# Patient Record
Sex: FEMALE | Race: NATIVE HAWAIIAN OR OTHER PACIFIC ISLANDER | Employment: FULL TIME | URBAN - METROPOLITAN AREA
[De-identification: names, ages, dates, MRNs, and addresses within clinical notes are randomized per-mention and may not be internally consistent; named-entity substitution may affect disease eponyms.]

---

## 2017-03-17 ENCOUNTER — OFFICE VISIT (OUTPATIENT)
Dept: SURGERY | Age: 42
End: 2017-03-17

## 2017-03-17 VITALS
RESPIRATION RATE: 16 BRPM | OXYGEN SATURATION: 100 % | HEIGHT: 64 IN | WEIGHT: 240 LBS | SYSTOLIC BLOOD PRESSURE: 134 MMHG | BODY MASS INDEX: 40.97 KG/M2 | DIASTOLIC BLOOD PRESSURE: 83 MMHG | HEART RATE: 89 BPM

## 2017-03-17 DIAGNOSIS — G47.30 SLEEP APNEA, UNSPECIFIED TYPE: ICD-10-CM

## 2017-03-17 DIAGNOSIS — F17.200 SMOKING: ICD-10-CM

## 2017-03-17 DIAGNOSIS — E28.2 PCOS (POLYCYSTIC OVARIAN SYNDROME): ICD-10-CM

## 2017-03-17 DIAGNOSIS — E66.01 MORBID OBESITY WITH BODY MASS INDEX OF 40.0-49.9 (HCC): Primary | ICD-10-CM

## 2017-03-17 DIAGNOSIS — E78.00 HYPERCHOLESTEROLEMIA: ICD-10-CM

## 2017-03-17 DIAGNOSIS — E11.9 TYPE 2 DIABETES MELLITUS WITHOUT COMPLICATION, WITHOUT LONG-TERM CURRENT USE OF INSULIN (HCC): ICD-10-CM

## 2017-03-17 DIAGNOSIS — E66.01 MORBID OBESITY DUE TO EXCESS CALORIES (HCC): ICD-10-CM

## 2017-03-17 DIAGNOSIS — N92.6 IRREGULAR MENSES: ICD-10-CM

## 2017-03-17 DIAGNOSIS — I10 ESSENTIAL HYPERTENSION: ICD-10-CM

## 2017-03-17 DIAGNOSIS — F32.A DEPRESSION, UNSPECIFIED DEPRESSION TYPE: ICD-10-CM

## 2017-03-17 RX ORDER — VARENICLINE TARTRATE 1 MG/1
1 TABLET, FILM COATED ORAL
COMMUNITY
End: 2017-07-25

## 2017-03-17 RX ORDER — VALSARTAN AND HYDROCHLOROTHIAZIDE 160; 12.5 MG/1; MG/1
1 TABLET, FILM COATED ORAL
COMMUNITY
End: 2018-02-12

## 2017-03-17 RX ORDER — BISMUTH SUBSALICYLATE 262 MG
1 TABLET,CHEWABLE ORAL DAILY
COMMUNITY
End: 2017-08-16

## 2017-03-17 RX ORDER — BUPROPION HYDROCHLORIDE 150 MG/1
TABLET, EXTENDED RELEASE ORAL 2 TIMES DAILY
COMMUNITY
End: 2017-06-29

## 2017-03-17 RX ORDER — ATORVASTATIN CALCIUM 20 MG/1
20 TABLET, FILM COATED ORAL
COMMUNITY

## 2017-03-17 RX ORDER — LABETALOL 100 MG/1
100 TABLET, FILM COATED ORAL 2 TIMES DAILY
COMMUNITY

## 2017-03-17 RX ORDER — METFORMIN HYDROCHLORIDE 1000 MG/1
1000 TABLET ORAL 2 TIMES DAILY WITH MEALS
COMMUNITY
End: 2017-08-16

## 2017-03-17 NOTE — MR AVS SNAPSHOT
Visit Information Date & Time Provider Department Dept. Phone Encounter #  
 3/17/2017  9:30 AM Rosales Anders MD [facility] [place] [phone] Follow-up Instructions Follow-up and Disposition History Upcoming Health Maintenance Date Due HEMOGLOBIN A1C Q6M 1976 LIPID PANEL Q1 1976 FOOT EXAM Q1 9/9/1986 MICROALBUMIN Q1 9/9/1986 EYE EXAM RETINAL OR DILATED Q1 9/9/1986 Pneumococcal 19-64 Medium Risk (1 of 1 - PPSV23) 9/9/1995 DTaP/Tdap/Td series (1 - Tdap) 9/9/1997 PAP AKA CERVICAL CYTOLOGY 9/9/1997 INFLUENZA AGE 9 TO ADULT 8/1/2016 Allergies as of 3/17/2017  Review Complete On: 3/17/2017 By: Rosales Anders MD  
 No Known Allergies Current Immunizations  Never Reviewed No immunizations on file. Not reviewed this visit You Were Diagnosed With   
  
 Codes Comments Morbid obesity with body mass index of 40.0-49.9 (HCC)    -  Primary ICD-10-CM: E66.01 
ICD-9-CM: 278.01 Morbid obesity due to excess calories (HCC)     ICD-10-CM: E66.01 
ICD-9-CM: 278.01 Type 2 diabetes mellitus without complication, without long-term current use of insulin (HCC)     ICD-10-CM: E11.9 ICD-9-CM: 250.00 Essential hypertension     ICD-10-CM: I10 
ICD-9-CM: 401.9 Hypercholesterolemia     ICD-10-CM: E78.00 ICD-9-CM: 272.0 Smoking     ICD-10-CM: F17.200 ICD-9-CM: 305.1 Depression, unspecified depression type     ICD-10-CM: F32.9 ICD-9-CM: 477 Sleep apnea, unspecified type     ICD-10-CM: G47.30 ICD-9-CM: 780.57 Irregular menses     ICD-10-CM: N92.6 ICD-9-CM: 626.4 PCOS (polycystic ovarian syndrome)     ICD-10-CM: E28.2 ICD-9-CM: 256.4 Vitals BP Pulse Resp Height(growth percentile) Weight(growth percentile) SpO2  
 134/83 (BP 1 Location: Left arm, BP Patient Position: Sitting) 89 16 5' 4\" (1.626 m) 240 lb (108.9 kg) 100% BMI OB Status Smoking Status 41.2 kg/m2 Having regular periods Current Every Day Smoker Vitals History BMI and BSA Data Body Mass Index Body Surface Area  
 41.2 kg/m 2 2.22 m 2 Your Updated Medication List  
  
   
This list is accurate as of: 3/17/17 10:41 AM.  Always use your most recent med list.  
  
  
  
  
 atorvastatin 20 mg tablet Commonly known as:  LIPITOR Take  by mouth daily. CHANTIX 1 mg tablet Generic drug:  varenicline Take 1 mg by mouth two (2) times daily (after meals). FISH OIL PO Take  by mouth. labetalol 100 mg tablet Commonly known as:  Casie Runner Take  by mouth two (2) times a day. metFORMIN 1,000 mg tablet Commonly known as:  GLUCOPHAGE Take 1,000 mg by mouth two (2) times daily (with meals). multivitamin tablet Commonly known as:  ONE A DAY Take 1 Tab by mouth daily. valsartan-hydroCHLOROthiazide 160-12.5 mg per tablet Commonly known as:  DIOVAN-HCT Take 1 Tab by mouth daily. VICTOZA 3-VISHAL SC  
by SubCUTAneous route. WELLBUTRIN  mg SR tablet Generic drug:  buPROPion SR Take  by mouth two (2) times a day. To-Do List   
 03/17/2017 Lab:  CBC WITH AUTOMATED DIFF   
  
 03/17/2017 Lab:  H. PYLORI ABS, IGG, IGA, IGM   
  
 03/17/2017 Lab:  TSH 3RD GENERATION Patient Instructions Body Mass Index: Care Instructions Your Care Instructions Body mass index (BMI) can help you see if your weight is raising your risk for health problems. It uses a formula to compare how much you weigh with how tall you are. A BMI between 18.5 and 24.9 is considered healthy. A BMI between 25 and 29.9 is considered overweight. A BMI of 30 or higher is considered obese. If your BMI is in the normal range, it means that you have a lower risk for weight-related health problems.  If your BMI is in the overweight or obese range, you may be at increased risk for weight-related health problems, such as high blood pressure, heart disease, stroke, arthritis or joint pain, and diabetes. BMI is just one measure of your risk for weight-related health problems. You may be at higher risk for health problems if you are not active, you eat an unhealthy diet, or you drink too much alcohol or use tobacco products. Follow-up care is a key part of your treatment and safety. Be sure to make and go to all appointments, and call your doctor if you are having problems. It's also a good idea to know your test results and keep a list of the medicines you take. How can you care for yourself at home? · Practice healthy eating habits. This includes eating plenty of fruits, vegetables, whole grains, lean protein, and low-fat dairy. · Get at least 30 minutes of exercise 5 days a week or more. Brisk walking is a good choice. You also may want to do other activities, such as running, swimming, cycling, or playing tennis or team sports. · Do not smoke. Smoking can increase your risk for health problems. If you need help quitting, talk to your doctor about stop-smoking programs and medicines. These can increase your chances of quitting for good. · Limit alcohol to 2 drinks a day for men and 1 drink a day for women. Too much alcohol can cause health problems. If you have a BMI higher than 25 · Your doctor may do other tests to check your risk for weight-related health problems. This may include measuring the distance around your waist. A waist measurement of more than 40 inches in men or 35 inches in women can increase the risk of weight-related health problems. · Talk with your doctor about steps you can take to stay healthy or improve your health. You may need to make lifestyle changes to lose weight and stay healthy, such as changing your diet and getting regular exercise. Where can you learn more? Go to http://mariano-ashlee.info/. Enter S176 in the search box to learn more about \"Body Mass Index: Care Instructions. \" Current as of: February 16, 2016 Content Version: 11.1 © 0703-8085 [employer]. Care instructions adapted under license by [employer] (which disclaims liability or warranty for this information). If you have questions about a medical condition or this instruction, always ask your healthcare professional. [facility] any warranty or liability for your use of this information. New patient Instructions 1. Ensure all pre-operative insurances requirements are complete (ie; dietary visits, psychology consults, primary care documentation, etc) 2. Adhere to pre-operative weight loss / weight maintenance plan discussed in the office today. 3. Contact the office with any questions on pre-operative clearance issues (ie; cardiology work-up, pulmonary work-up, upper GI study, etc). 4. If a barium upper GI study has been ordered for your evaluation, make sure you are on liquids only the morning of the procedure. Patient Instructions History Introducing [facility]! Celi Rubin introduces EpicTopic patient portal. Now you can access parts of your medical record, email your doctor's office, and request medication refills online. 1. In your internet browser, go to [email or web address] 2. Click on the First Time User? Click Here link in the Sign In box. You will see the New Member Sign Up page. 3. Enter your EpicTopic Access Code exactly as it appears below. You will not need to use this code after youve completed the sign-up process. If you do not sign up before the expiration date, you must request a new code. · EpicTopic Access Code: [email or web address] Expires: 6/15/2017 10:05 AM 
 
4. Enter the last four digits of your Social Security Number (xxxx) and Date of Birth (mm/dd/yyyy) as indicated and click Submit.  You will be taken to the next sign-up page. 5. Create a National Payment Network ID. This will be your [employer] login ID and cannot be changed, so think of one that is secure and easy to remember. 6. Create a [employer] password. You can change your password at any time. 7. Enter your Password Reset Question and Answer. This can be used at a later time if you forget your password. 8. Enter your e-mail address. You will receive e-mail notification when new information is available in [address]. 9. Click Sign Up. You can now view and download portions of your medical record. 10. Click the Download Summary menu link to download a portable copy of your medical information. If you have questions, please visit the Frequently Asked Questions section of the [employer] website. Remember, [employer] is NOT to be used for urgent needs. For medical emergencies, dial 911. Now available from your iPhone and Android! Please provide this summary of care documentation to your next provider. Your primary care clinician is listed as Adolfo Hickman. If you have any questions after today's visit, please call [phone].

## 2017-03-17 NOTE — PROGRESS NOTES
Bariatric Surgery Consultation    Subjective: The patient is a 37 y.o. obese female with a Body mass index is 41.2 kg/(m^2). Elias Roque The patient is currently her heaviest weight for the past 2 years. she has been overweight since childhood. she has been considering surgery since last year. she desires surgery at this time because of   multiple health concerns and their lifestyle issues which are hindered by their weight. she has been referred by his family physician Dr Norma Valderrama   for evaluation and treatment of their obesity via surgical intervention. Jason Wagner has tried multiple diets in her lifetime most recently   tried behavior modification and unsupervised diets    Bariatric comorbidities present are   Patient Active Problem List   Diagnosis Code    Morbid obesity ([facility] E66.01    Morbid obesity with body mass index of 40.0-49.9 ([address] E66.01    Diabetes mellitus ([facility] [address] E11.9    Hypertension I10    Hypercholesterolemia E78.00    Smoking F17.200    Depression F32.9    Sleep apnea G47.30    Irregular menses N92.6    PCOS (polycystic ovarian syndrome) E28.2       The patient is considering laparoscopic gastric bypass surgery for surgical weight loss due to their ineffective progress with medical forms of   weight loss and the urging of their physician who cares for their primary medical issues. The patient  now presents  for consideration for weight   loss surgery understanding the benefits of this over a medical approach of weight loss as was discussed in our presentation on weight loss surgery. They have discussed their plans both with their family and primary care physician who is in support of their pursuit of such. The patient has had no   health issues as of late and denies and gastrointestinal disturbances other than what is outlined below in their review of symptoms.  All of their   prior evaluations available by both their PCP's and specialists physicians have been reviewed today either in the Care Everywhere portal or scanned under the media tab. I have spent a large portion of my initial consultation today reviewing the patients current dietary habits which have contributed to their health   issues and obesity. I have suggested to them personally a dietary regimen that they can initiate now to help with their status as it pertains to their weight. They   understand that the most important aspect of their journey through their weight loss endeavor will be their adherence to a new lifestyle of   healthy eating behavior. They also understand that an adherence to an exercise program will not only help with weight loss but is ultimately   important in weight maintenance. The patients goal weight is 157 lb. These goals are consistent with expected outcomes of their desired operation. her Medical goals are resolution of these health issues. Patient Active Problem List    Diagnosis Date Noted    Morbid obesity ([facility] [address]     Morbid obesity with body mass index of 40.0-49.9 ([facility] [address]     [facility]     Hypertension     Hypercholesterolemia     Smoking     Depression     Sleep apnea     Irregular menses     PCOS (polycystic ovarian syndrome)       Past Surgical History:   Procedure Laterality Date    DELIVERY       X 2    HX KNEE ARTHROSCOPY      HX TONSILLECTOMY      HX TUBAL LIGATION        Social History   Substance Use Topics    Smoking status: Current Every Day Smoker     Years: 0.10     Types: Cigarettes    Smokeless tobacco: Not on file    Alcohol use No      Family History   Problem Relation Age of Onset    Hypertension Father     Arthritis-osteo Father       Current Outpatient Prescriptions   Medication Sig Dispense Refill    valsartan-hydroCHLOROthiazide (DIOVAN-HCT) 160-12.5 mg per tablet Take 1 Tab by mouth daily.  metFORMIN (GLUCOPHAGE) 1,000 mg tablet Take 1,000 mg by mouth two (2) times daily (with meals).       LIRAGLUTIDE (VICTOZA 3-VISHAL SC) by SubCUTAneous route.  atorvastatin (LIPITOR) 20 mg tablet Take  by mouth daily.  buPROPion SR (WELLBUTRIN SR) 150 mg SR tablet Take  by mouth two (2) times a day.  multivitamin (ONE A DAY) tablet Take 1 Tab by mouth daily.  DOCOSAHEXANOIC ACID/EPA (FISH OIL PO) Take  by mouth.  labetalol (NORMODYNE) 100 mg tablet Take  by mouth two (2) times a day.  varenicline (CHANTIX) 1 mg tablet Take 1 mg by mouth two (2) times daily (after meals).        No Known Allergies       Review of Systems:        General - No history or complaints of unexpected fever, chills, or weight loss  Head/Neck - No history or complaints of headache, diplopia, dysphagia, hearing loss  Cardiac - No history or complaints of chest pain, palpitations, murmur, or shortness of breath  Pulmonary - No history or complaints of shortness of breath, productive cough, hemoptysis  Gastrointestinal - (-) reflux noted,no  abdominal pain, obstipation/constipation or blood per rectum  Genitourinary - No history or complaints of hematuria/dysuria, stress urinary incontinence symptoms, or renal lithiasis  Musculoskeletal - mild joint pain in their knees,  no muscular weakness  Hematologic - No history or complaints of bleeding disorders,  No blood transfusions  Neurologic - No history or complaints of  migraine headaches, seizure activity, syncopal episodes, TIA or stroke  Integumentary - No history or complaints of rashes, abnormal nevi, skin cancer  Gynecological - irregular menses due to PCOS    Objective:     Visit Vitals    /83 (BP 1 Location: Left arm, BP Patient Position: Sitting)    Pulse 89    Resp 16    Ht 5' 4\" (1.626 m)    Wt 108.9 kg (240 lb)    SpO2 100%    BMI 41.2 kg/m2       Physical Examination: General appearance - alert, well appearing, and in no distress and oriented to person, place, and time  Mental status - alert, oriented to person, place, and time, normal mood, behavior, speech, dress, motor activity, and thought processes  Eyes - pupils equal and reactive, extraocular eye movements intact, sclera anicteric, left eye normal, right eye normal  Ears - right ear normal, left ear normal  Nose - normal and patent, no erythema, discharge or polyps  Mouth - mucous membranes moist, pharynx normal without lesions  Neck - supple, no significant adenopathy  Lymphatics - no palpable lymphadenopathy, no hepatosplenomegaly  Chest - clear to auscultation, no wheezes, rales or rhonchi, symmetric air entry  Heart - normal rate, regular rhythm, normal S1, S2, no murmurs, rubs, clicks or gallops  Abdomen - soft, nontender, nondistended, no masses or organomegaly  Back exam - full range of motion, no tenderness, palpable spasm or pain on motion  Neurological - alert, oriented, normal speech, no focal findings or movement disorder noted  Musculoskeletal - no joint tenderness, deformity or swelling  Extremities - peripheral pulses normal, no pedal edema, no clubbing or cyanosis  Skin - normal coloration and turgor, no rashes, no suspicious skin lesions noted    Labs:     No results found for: WBC, WBCLT, HGBPOC, HGB, HGBP, HCTPOC, HCT, PHCT, RBCH, PLT, MCV, HGBEXT, HCTEXT, PLTEXT, HGBEXT, HCTEXT, PLTEXT  No results found for: NA, K, CL, CO2, AGAP, GLU, BUN, CREA, BUCR, GFRAA, GFRNA, CA, TBIL, TBILI, GPT, SGOT, AP, TP, ALB, GLOB, AGRAT, ALT  No results found for: IRON, FE, TIBC, IBCT, PSAT, FERR  No results found for: FOL, RBCF  No results found for: VITD3, XQVID2, XQVID3, XQVID, VD3RIA              Assessment:     Morbid obesity with associated comorbidity    Plan:     laparoscopic gastric bypass surgery    This is a 37 y.o. female with a BMI of Body mass index is 41.2 kg/(m^2). and the weight-related co-morbidties of (as above). Nallely Montana meets the NIH criteria for bariatric surgery based upon the BMI of Body mass index is 41.2 kg/(m^2). and multiple weight-related co-morbidties.  Nallely Montana has elected laparoscopic gastric bypass as her intervention of choice for treatment of morbid obestiy through surgical means secondary to its uniform results,  profound baseline suppression of hunger and pace at which weight is lost.    In the office today, following Edie's history and physical examination, a 30 minute discussion regarding the anatomic alterations for the laparoscopic gastric bypass  was undertaken. The dietary expectations and the patient  dependent factors for success were thoroughly discussed, to include the need for interval follow-up and long-term dietary changes associated with success. The possible short and long term  complications of the gastric bypass were also discussed, to include but not limited to;death, DVT/PE, staple line leak, bleeding, stricture formation, infection,internal hernia  and pouch dilation. Specific weight related outcomes for success were also discussed with an emphasis on careful and close follow-up with the first year and dietary behavior modification over the first years as baseline cyclical hunger returns  The patient expressed an understanding of the above factors, and her questions were answered in their entirety. In addition, the patient attended a 1.5 hour power point seminar regarding obesity, surgical weight loss including, adjustable gastric band, gastric bypass, and sleeve gastrectomy. This discussion contrasted the different surgical techniques, mechanisms of actions and expected outcomes, and surgical and medical risks associated with each procedure. During this seminar, there was a long question and answer session where each questions was answered until there were no additional questions. Today, the patient had all of her questions answered and desires to proceed with  bariatric surgery initially choosing the gastric bypass as her surgical option.     She will complete her [facility] program in June 2017    Secondary Diagnoses:     Dietary Intervention  - The patient is currently scheduled to see or has been followed by a bariatric nutritionist for an attempt at preoperative weight loss as has been dictated by their insurance carrier. They will be assessed at various times during their follow up to evaluate their progress depending on the length of time that is required once again by their carrier. I have explained the importance of preoperative weight loss and the benefits regarding lower surgical risk and also assisting the patient in reaching their weight loss goal.  Finally they understand their is a physiologic benefit from the standpoint of hepatic volume reduction preoperatively. I have reiterated the importance of a low carbohydrate and high protein regimen to achieve their stated goal.    Adult Onset Diabetes - The patient has perla given a very low carbohydrate diet preoperatively along with instructions to monitor their blood sugars on a regular daily basis. When  their surgery is performed  we will be monitoring the patient with sliding scale insulin and accuchecks.  Based on those values we will determine whether the patient needs a reduction of those medications postoperatively or total removal of those medications on discharge.  We will have the patient continue accuchecks postoperatively while at home also and report to me or their family physician for appropriate adjustments as needed.  The patient also understands that in the event of uncontrolled blood sugar preoperatively that we may choose to postpone their surgery. Hypertension - The patient has a clear understanding of how weight loss improves hypertension as a whole, but also they understand that there is a significant genetic component to this disease process.  We will monitor the patients blood pressure while in the hospital and the plan would be to continue those medications postoperatively.  If a diuretic is being used we will stop them on discharge to prevent dehydration particularly with the sleeve gastrectomy and the gastric bypass procedures.  They will be instructed to monitor their blood pressure postoperatively while at home and notify their primary care physician in the event of any significantly high or uncharacteristic readings. Hyperlipidemia - The patient understands that studies show that almost all patient will realize an improvement in their lipid profile with weight loss that occurs with these procedures. They however also understand that hyperlipidemia is a multifactorial disease particularly as it pertains to their genetic background and that there is no guarantee toward cure  of this issue. We will resume their medications immediately postoperatively as this tends to decrease any post operative cardiac events.  The patient will follow up with their family physician in the postoperative period with plans to repeat their lipid panel 2-3 month postoperative for potential adjustment or removal of these medications. Obstructive Sleep Apnea -The patient understands the association of sleep apnea and obesity and the additional risk that it caries related to post surgical complications. We will have the patient bring their CPAP machine to the hospital for use both postoperatively in the PACU and on the floor at its appropriate setting.  We will have them continue using it while at home after surgery and follow up with their pulmonologist 6 months after to be retested to see if it can be discontinued at that time period.     Polycystic Ovarian Syndrome -The patient does understand the association between obesity and the development of PCOS.  They understand that weight loss can often improve symptoms and in many cases cure the disease.  If the disease is associated with infertility this too is often corrected with adequate weight loss.  The patient understands that any change to the pharmaceutical treatment of her PCOS will be managed by the primary care physician or OB/GYN    Smoking Cessation - Today I have counseled the patient extensively regarding smoking cessation for greater than 10 minutes. They have been counseled extensively about the detrimental effects of smoking on their weight loss surgical procedure particularly for the gastric bypass and sleeve gastrectomy procedures. They understand that smoking leads to pulmonary issues postoperatively and can lead to gastric ulcers and marginal ulcers in the post bariatric surgery pouch that has been created. They understand that they must stop smoking prior to surgery or it may affect their ultimate progression to their procedure. They understand finally that labs may be obtained to prove that they have ceased smoking prior to surgery. Total time counseling was greater than 10 minutes.     Signed By: Roxanne Thompson MD     March 17, 2017

## 2017-03-17 NOTE — PATIENT INSTRUCTIONS
Body Mass Index: Care Instructions  Your Care Instructions    Body mass index (BMI) can help you see if your weight is raising your risk for health problems. It uses a formula to compare how much you weigh with how tall you are. A BMI between 18.5 and 24.9 is considered healthy. A BMI between 25 and 29.9 is considered overweight. A BMI of 30 or higher is considered obese. If your BMI is in the normal range, it means that you have a lower risk for weight-related health problems. If your BMI is in the overweight or obese range, you may be at increased risk for weight-related health problems, such as high blood pressure, heart disease, stroke, arthritis or joint pain, and diabetes. BMI is just one measure of your risk for weight-related health problems. You may be at higher risk for health problems if you are not active, you eat an unhealthy diet, or you drink too much alcohol or use tobacco products. Follow-up care is a key part of your treatment and safety. Be sure to make and go to all appointments, and call your doctor if you are having problems. It's also a good idea to know your test results and keep a list of the medicines you take. How can you care for yourself at home? · Practice healthy eating habits. This includes eating plenty of fruits, vegetables, whole grains, lean protein, and low-fat dairy. · Get at least 30 minutes of exercise 5 days a week or more. Brisk walking is a good choice. You also may want to do other activities, such as running, swimming, cycling, or playing tennis or team sports. · Do not smoke. Smoking can increase your risk for health problems. If you need help quitting, talk to your doctor about stop-smoking programs and medicines. These can increase your chances of quitting for good. · Limit alcohol to 2 drinks a day for men and 1 drink a day for women. Too much alcohol can cause health problems.   If you have a BMI higher than 25  · Your doctor may do other tests to check your risk for weight-related health problems. This may include measuring the distance around your waist. A waist measurement of more than 40 inches in men or 35 inches in women can increase the risk of weight-related health problems. · Talk with your doctor about steps you can take to stay healthy or improve your health. You may need to make lifestyle changes to lose weight and stay healthy, such as changing your diet and getting regular exercise. Where can you learn more? Go to http://mariano-ashlee.info/. Enter S176 in the search box to learn more about \"Body Mass Index: Care Instructions. \"  Current as of: February 16, 2016  Content Version: 11.1  © 0354-5018 [employer]. Care instructions adapted under license by [employer] (which disclaims liability or warranty for this information). If you have questions about a medical condition or this instruction, always ask your healthcare professional. [place] [address] any warranty or liability for your use of this information. New patient Instructions      1. Ensure all pre-operative insurances requirements are complete (ie; dietary visits, psychology consults, primary care documentation, etc)    2. Adhere to pre-operative weight loss / weight maintenance plan discussed in the office today. 3. Contact the office with any questions on pre-operative clearance issues (ie; cardiology work-up, pulmonary work-up, upper GI study, etc). 4. If a barium upper GI study has been ordered for your evaluation, make sure you are on liquids only the morning of the procedure.

## 2017-03-27 ENCOUNTER — CLINICAL SUPPORT (OUTPATIENT)
Dept: SURGERY | Age: 42
End: 2017-03-27

## 2017-03-27 VITALS — BODY MASS INDEX: 41.15 KG/M2 | HEIGHT: 64 IN | WEIGHT: 241 LBS

## 2017-03-27 DIAGNOSIS — E66.01 MORBID OBESITY WITH BODY MASS INDEX OF 40.0-49.9 (HCC): Primary | ICD-10-CM

## 2017-03-27 NOTE — PROGRESS NOTES
Medical Weight Loss Multi-Disciplinary Program    Name: Harmony Ortiz   : 1975    Session# 2  Date: 3/27/2017     Height: 5' 4\" (162.6 cm)    Weight: 109.3 kg (241 lb) lbs. Body mass index is 41.37 kg/(m^2). Pounds Gained: 1    Dietary Instructions    Reviewed intake  Understanding label reading  Understanding low carbohydrates, low sugar, higher protein meals  Understanding proper portions  Dining outside home  Instruction given for personal dietary changes  Discussed perceived compliance  Comments: Pt given brief pre/post-op diet ed and diet hx reviewed. Physical Activity/Exercise    Discussed Perceived Compliance  Reasonable Goals Set  Motivation  Comments: Pt. Currently does not have an exercise routine     Behavior Modification    Positive attitude  Comments: Pt is working on the following goals:    Candidate for surgery (per RD): Pending     Dietitian: Arina Liao is a 37 y.o. female who present for a pre-op evaluation. Visit Vitals    Ht 5' 4\" (1.626 m)    Wt 109.3 kg (241 lb)    BMI 41.37 kg/m2     Past Medical History:   Diagnosis Date    Depression     Diabetes mellitus ([facility] [address]     Dx  / HgbA1c 7.2017    Hypercholesterolemia     Hypertension     Irregular menses     Morbid obesity ([facility] [address]     Morbid obesity with body mass index of 40.0-49.9 (HCC)     PCOS (polycystic ovarian syndrome)     Sleep apnea     uses c-pap    Smoking     on chantix as of 2017           Procedure:  laparoscopic gastric bypass surgery     Reasons for Surgery:  BMI > 40 with one or more medically significant comorbidities    Summary:  Pt given brief pre/post-op diet ed and diet hx reviewed. Pt set several goals. See below.      Current Vitamins: MVI with Iron, Fish Oil     Patient Education and Materials Provided:  Supplement [facility], B Vitamin Information, MVI Recommendations, Calcium Citrate Information, Bariatric Supplement Companies, Protein Supplement Information, Fluid Requirements, No Caffeine or Carbonation, No Alcohol for One Year Post Op, 3 Balanced Meals a Day, Food Group Guide, Good Choices Dining Out, No Snacks, No Concentrated Sweets, Support System at Home, Exercising, Support Group Information and Addressed Current Habits / Changes to make    Nutritional Hx: What is the number of meals you eat per day? 3  Comment: 3 complete meals with 2 snack     Do you eat between meals / snack? yes  Typical snack: banana, peanut butter     How fast do you eat your meals? Slow - takes around 20 minutes     How many sodas/sugared beverages do you drink per day? 32-48 oz. Of diet soda     How many caffeinated drinks do you have per day? None    How much water do you drink per day? 8 (8oz glasses)    How often do you eat fast food? 2 times a week    How often do you consume alcohol? never; None     Diet History:  Breakfast  What are you eating and how much? Glucerna shake, Premier protein    When? 8:30-9   Where? iii   Snacks  What are you eating and how much? Banana    When? 11:30-noon   Where? iii   Hydration  What are you eating and how much? 24 oz. Water; half a can of diet coke    When? ii   Where? ii   Lunch  What are you eating and how much? Cammy sandwich on whole wheat, deli meat, cheese, Abel's salads, chick judd la    When? 1-2   Where? iii   Snacks  What are you eating and how much? None   When? ii   Where? iii   Hydration  What are you eating and how much? Diet coke (16 oz.); water (16 oz.)   When? ii   Where? iii   Dinner  What are you eating and how much? Chicken, brisket, lean protein, broccoli and green beans, mashed potatoes or bread, mac and cheese   When? Where? iii   Snacks  What are you eating and how much? Skinny cow ice cream sandwich, SF pudding cup   When? ii   Where? Iii   Hydration  What are you eating and how much? Water (16 oz.)   When? ii   Where? iii     Exercise:  Do you currently have an exercise routine? no    Goals:   1.  Start walking up and down the stairs at work at least 4 times a week for at least 15 minutes   2.  Decrease diet soda intake and replace with sugar free beverage (crystal light) or water

## 2017-03-27 NOTE — PATIENT INSTRUCTIONS
Goals: 1. Start walking up and down the stairs at work at least 4 times a week for at least 15 minutes   2.  Decrease diet soda intake and replace with sugar free beverage (crystal light) or water

## 2017-03-29 ENCOUNTER — SURGERY (OUTPATIENT)
Age: 42
End: 2017-03-29

## 2017-03-29 ENCOUNTER — HOSPITAL ENCOUNTER (OUTPATIENT)
Age: 42
Setting detail: OUTPATIENT SURGERY
Discharge: HOME | End: 2017-03-29
Attending: SPECIALIST | Admitting: SPECIALIST
Payer: COMMERCIAL

## 2017-03-29 ENCOUNTER — APPOINTMENT (OUTPATIENT)
Dept: GENERAL RADIOLOGY | Age: 42
End: 2017-03-29
Attending: SPECIALIST
Payer: COMMERCIAL

## 2017-03-29 VITALS
WEIGHT: 239.4 LBS | DIASTOLIC BLOOD PRESSURE: 83 MMHG | SYSTOLIC BLOOD PRESSURE: 137 MMHG | TEMPERATURE: 98.5 F | BODY MASS INDEX: 40.87 KG/M2 | HEART RATE: 86 BPM | RESPIRATION RATE: 16 BRPM | OXYGEN SATURATION: 98 % | HEIGHT: 64 IN

## 2017-03-29 DIAGNOSIS — E66.01 MORBID OBESITY (HCC): ICD-10-CM

## 2017-03-29 PROCEDURE — 74011000255 HC RX REV CODE- 255: Performed by: SPECIALIST

## 2017-03-29 PROCEDURE — 76040000019: Performed by: SPECIALIST

## 2017-03-29 PROCEDURE — 74240 X-RAY XM UPR GI TRC 1CNTRST: CPT

## 2017-03-29 RX ADMIN — BARIUM SULFATE 30 ML: 960 POWDER, FOR SUSPENSION ORAL at 14:18

## 2017-03-29 NOTE — IP AVS SNAPSHOT
[facility] 
 
 
 [address] [address] 
[phone] Patient: Obinna Modi MRN: [id number] AVN:[id number] You are allergic to the following No active allergies Recent Documentation Height Weight BMI OB Status Smoking Status 1.626 m 108.6 kg 41.09 kg/m2 Having regular periods Current Every Day Smoker Emergency Contacts Name Discharge Info Relation Home Work Mobile [address] [facility] CAREGIVER [3] Spouse [3] [phone] About your hospitalization You were admitted on:  March 29, 2017 You last received care in the:  [facility] ENDOSCOPY You were discharged on:  March 29, 2017 Unit phone number:  [phone] Why you were hospitalized Your primary diagnosis was:  Not on File Providers Seen During Your Hospitalizations Provider Role Specialty Primary office phone Jenny Jhaveri MD Attending Provider General Surgery [phone] Your Primary Care Physician (PCP) Primary Care Physician Office Phone Office Fax Byron Horne [phone] Follow-up Information None Your Appointments Wednesday March 29, 2017 ENDOSCOPY with Jenny Jhaveri MD  
[facility] ENDOSCOPY [facility]) [address] [address]  
[phone] Monday April 24, 2017  6:00 PM EDT  
EDUCATION CLASS with TSS NUTRI VISIT [facility] ([facility])  
 [place] [address] [address] [place] [address]  
[phone] Current Discharge Medication List  
  
ASK your doctor about these medications Dose & Instructions Dispensing Information Comments Morning Noon Evening Bedtime  
 atorvastatin 20 mg tablet Commonly known as:  LIPITOR Your last dose was: Your next dose is: Take  by mouth daily. Refills:  0 CHANTIX 1 mg tablet Generic drug:  varenicline Your last dose was: Your next dose is:    
   
   
 Dose:  1 mg Take 1 mg by mouth two (2) times daily (after meals). Refills:  0  
     
   
   
   
  
 FISH OIL PO Your last dose was: Your next dose is: Take  by mouth. Refills:  0  
     
   
   
   
  
 labetalol 100 mg tablet Commonly known as:  Chyrel Gonzalez Your last dose was: Your next dose is: Take  by mouth two (2) times a day. Refills:  0  
     
   
   
   
  
 metFORMIN 1,000 mg tablet Commonly known as:  GLUCOPHAGE Your last dose was: Your next dose is:    
   
   
 Dose:  1000 mg Take 1,000 mg by mouth two (2) times daily (with meals). Refills:  0  
     
   
   
   
  
 multivitamin tablet Commonly known as:  ONE A DAY Your last dose was: Your next dose is:    
   
   
 Dose:  1 Tab Take 1 Tab by mouth daily. Refills:  0  
     
   
   
   
  
 valsartan-hydroCHLOROthiazide 160-12.5 mg per tablet Commonly known as:  DIOVAN-HCT Your last dose was: Your next dose is:    
   
   
 Dose:  1 Tab Take 1 Tab by mouth daily. Refills:  0  
     
   
   
   
  
 VICTOZA 3-VISHAL SC Your last dose was: Your next dose is:    
   
   
 by SubCUTAneous route. Refills:  0 WELLBUTRIN  mg SR tablet Generic drug:  buPROPion SR Your last dose was: Your next dose is: Take  by mouth two (2) times a day. Refills:  0 Discharge Instructions Verbal and written post adjustment / UGI instructions given. Patient acknowledges understanding. Discussed diet, activities, and s/s that should be reported. Encouraged to call to schedule next appointment and to call with any questions or concerns. Discharge Orders None Introducing [facility]! Marcia Hutchison introduces [employer] patient portal. Now you can access parts of your medical record, email your doctor's office, and request medication refills online. 1. In your internet browser, go to [email or web address] 2. Click on the First Time User? Click Here link in the Sign In box. You will see the New Member Sign Up page. 3. Enter your [employer] Access Code exactly as it appears below. You will not need to use this code after youve completed the sign-up process. If you do not sign up before the expiration date, you must request a new code. · [employer] Access Code: [email or web address] Expires: 6/15/2017 10:05 AM 
 
4. Enter the last four digits of your Social Security Number (xxxx) and Date of Birth (mm/dd/yyyy) as indicated and click Submit. You will be taken to the next sign-up page. 5. Create a [employer] ID. This will be your [employer] login ID and cannot be changed, so think of one that is secure and easy to remember. 6. Create a [employer] password. You can change your password at any time. 7. Enter your Password Reset Question and Answer. This can be used at a later time if you forget your password. 8. Enter your e-mail address. You will receive e-mail notification when new information is available in [address] 9. Click Sign Up. You can now view and download portions of your medical record. 10. Click the Download Summary menu link to download a portable copy of your medical information. If you have questions, please visit the Frequently Asked Questions section of the [employer] website. Remember, [employer] is NOT to be used for urgent needs. For medical emergencies, dial 911. Now available from your iPhone and Android! General Information Please provide this summary of care documentation to your next provider. Patient Signature:  ____________________________________________________________ Date:  ____________________________________________________________  
  
Joelene Batman Provider Signature:  ____________________________________________________________ Date:  ____________________________________________________________

## 2017-03-29 NOTE — IP AVS SNAPSHOT
Current Discharge Medication List  
  
ASK your doctor about these medications Dose & Instructions Dispensing Information Comments Morning Noon Evening Bedtime  
 atorvastatin 20 mg tablet Commonly known as:  LIPITOR Your last dose was: Your next dose is: Take  by mouth daily. Refills:  0 CHANTIX 1 mg tablet Generic drug:  varenicline Your last dose was: Your next dose is:    
   
   
 Dose:  1 mg Take 1 mg by mouth two (2) times daily (after meals). Refills:  0  
     
   
   
   
  
 FISH OIL PO Your last dose was: Your next dose is: Take  by mouth. Refills:  0  
     
   
   
   
  
 labetalol 100 mg tablet Commonly known as:  Reji Tamra Your last dose was: Your next dose is: Take  by mouth two (2) times a day. Refills:  0  
     
   
   
   
  
 metFORMIN 1,000 mg tablet Commonly known as:  GLUCOPHAGE Your last dose was: Your next dose is:    
   
   
 Dose:  1000 mg Take 1,000 mg by mouth two (2) times daily (with meals). Refills:  0  
     
   
   
   
  
 multivitamin tablet Commonly known as:  ONE A DAY Your last dose was: Your next dose is:    
   
   
 Dose:  1 Tab Take 1 Tab by mouth daily. Refills:  0  
     
   
   
   
  
 valsartan-hydroCHLOROthiazide 160-12.5 mg per tablet Commonly known as:  DIOVAN-HCT Your last dose was: Your next dose is:    
   
   
 Dose:  1 Tab Take 1 Tab by mouth daily. Refills:  0  
     
   
   
   
  
 VICTOZA 3-VISHAL SC Your last dose was: Your next dose is:    
   
   
 by SubCUTAneous route. Refills:  0 WELLBUTRIN  mg SR tablet Generic drug:  buPROPion SR Your last dose was: Your next dose is: Take  by mouth two (2) times a day. Refills:  0

## 2017-03-29 NOTE — PROCEDURES
Patient:Edie Tang   : 1975  Medical Record [id number]            PREPROCEDURE DIAGNOSIS: This patient is preoperative for laparoscopic gastric bypass surgeryprocedure with a history of  reflux disease. POSTPROCEDURE DIAGNOSIS: This patient is preoperative for laparoscopic gastric bypass surgeryprocedure with a history of  reflux disease. PROCEDURES PERFORMED: Upper GI study with barium. ESTIMATED BLOOD LOSS: None. SPECIMENS: None. STATEMENT OF MEDICAL NECESSITY: The patient is a patient with a  longstanding history of obesity. They are now considering the laparoscopic gastric bypass surgeryprocedure as a means of surgical weight control and due to their history of reflux disease and are being assessed preoperatively for such. DESCRIPTION OF PROCEDURE: The patient was brought to the fluoroscopy unit and  was given thin barium. On swallowing of barium, they were noted to have  normal peristalsis of their esophagus. They had prompt filling of distal  esophagus with tapering into the gastroesophageal junction. There was no evidence of a hiatal hernia present. Contrast then filled the gastric cardia, fundus,body and pre pyloric region with no abnormalities noted. Contrast then exited the pylorus in normal fashion. No obstruction was noted. There was no evidence of reflux noted.     (normal anatomy)    Marj Carrillo MD

## 2017-04-24 ENCOUNTER — OFFICE VISIT (OUTPATIENT)
Dept: SURGERY | Age: 42
End: 2017-04-24

## 2017-04-24 VITALS — HEIGHT: 64 IN | BODY MASS INDEX: 41.15 KG/M2 | WEIGHT: 241 LBS

## 2017-04-24 DIAGNOSIS — E66.01 MORBID OBESITY WITH BODY MASS INDEX OF 40.0-49.9 (HCC): Primary | ICD-10-CM

## 2017-04-24 NOTE — PROGRESS NOTES
Medical Weight Loss Multi-Disciplinary Program    Name: Ericka Frederick   : 1975    Session# 2- consecutive. Pt is also doing phone consults for [employer] insurance  Date: 2017     Height: 5' 4\" (162.6 cm)    Weight: 109.3 kg (241 lb) lbs. Body mass index is 41.37 kg/(m^2). Same weight     Dietary Instructions    Reviewed intake  Understanding low carbohydrates, low sugar, higher protein meals  Understanding proper portions  Instruction given for personal dietary changes  Discussed perceived compliance  Comments: Diet hx reviewed and personal dietary changes discussed. Pt received a Thoughtful Eating diet ed. Physical Activity/Exercise    Discussed Perceived Compliance  Reasonable Goals Set  Motivation  Comments: Pt achieved her goal of walking stairs at work twice per week. She plans to increase to 4 times per week. She is tracking steps. Her current average is about 7,000. She plans to increase to an average of 7,500 per day. She also plans to attend exercise classes at [facility] [employer] [facility]- at least once per week. Behavior Modification    Achieving/Rewarding goals met  Positive attitude  Discussed perceived compliance  Comments: Pt is exercising and plans to increase. She is working on meal planning- meal planning packet and recipes given and discussed. Also discussed support group and schedule given.      Candidate for surgery (per RD): pending    Dietitian: Yolanda Arora RD

## 2017-06-26 ENCOUNTER — HOSPITAL ENCOUNTER (OUTPATIENT)
Dept: PREADMISSION TESTING | Age: 42
Discharge: HOME | End: 2017-06-26
Payer: COMMERCIAL

## 2017-06-26 DIAGNOSIS — E66.01 MORBID OBESITY DUE TO EXCESS CALORIES (HCC): ICD-10-CM

## 2017-06-26 DIAGNOSIS — I10 ESSENTIAL HYPERTENSION: ICD-10-CM

## 2017-06-26 DIAGNOSIS — Z01.812 BLOOD TESTS PRIOR TO TREATMENT OR PROCEDURE: ICD-10-CM

## 2017-06-26 DIAGNOSIS — E11.9 TYPE 2 DIABETES MELLITUS WITHOUT COMPLICATION, WITHOUT LONG-TERM CURRENT USE OF INSULIN (HCC): Primary | ICD-10-CM

## 2017-06-26 LAB
ABO + RH BLD: NORMAL
ALBUMIN SERPL BCP-MCNC: 3.6 G/DL (ref 3.4–5)
ALBUMIN/GLOB SERPL: 0.9 {RATIO} (ref 0.8–1.7)
ALP SERPL-CCNC: 37 U/L (ref 45–117)
ALT SERPL-CCNC: 35 U/L (ref 13–56)
ANION GAP BLD CALC-SCNC: 11 MMOL/L (ref 3–18)
AST SERPL W P-5'-P-CCNC: 22 U/L (ref 15–37)
BASOPHILS # BLD AUTO: 0 K/UL (ref 0–0.06)
BASOPHILS # BLD: 0 % (ref 0–2)
BILIRUB SERPL-MCNC: 0.3 MG/DL (ref 0.2–1)
BLOOD GROUP ANTIBODIES SERPL: NORMAL
BUN SERPL-MCNC: 10 MG/DL (ref 7–18)
BUN/CREAT SERPL: 14 (ref 12–20)
CALCIUM SERPL-MCNC: 9.2 MG/DL (ref 8.5–10.1)
CHLORIDE SERPL-SCNC: 99 MMOL/L (ref 100–108)
CO2 SERPL-SCNC: 27 MMOL/L (ref 21–32)
CREAT SERPL-MCNC: 0.72 MG/DL (ref 0.6–1.3)
DIFFERENTIAL METHOD BLD: ABNORMAL
EOSINOPHIL # BLD: 0.4 K/UL (ref 0–0.4)
EOSINOPHIL NFR BLD: 3 % (ref 0–5)
ERYTHROCYTE [DISTWIDTH] IN BLOOD BY AUTOMATED COUNT: 12.5 % (ref 11.6–14.5)
EST. AVERAGE GLUCOSE BLD GHB EST-MCNC: 140 MG/DL
GLOBULIN SER CALC-MCNC: 3.8 G/DL (ref 2–4)
GLUCOSE SERPL-MCNC: 122 MG/DL (ref 74–99)
HBA1C MFR BLD: 6.5 % (ref 4.5–5.6)
HCT VFR BLD AUTO: 39.7 % (ref 35–45)
HGB BLD-MCNC: 13.5 G/DL (ref 12–16)
LYMPHOCYTES # BLD AUTO: 30 % (ref 21–52)
LYMPHOCYTES # BLD: 3.5 K/UL (ref 0.9–3.6)
MCH RBC QN AUTO: 30.5 PG (ref 24–34)
MCHC RBC AUTO-ENTMCNC: 34 G/DL (ref 31–37)
MCV RBC AUTO: 89.6 FL (ref 74–97)
MONOCYTES # BLD: 0.8 K/UL (ref 0.05–1.2)
MONOCYTES NFR BLD AUTO: 6 % (ref 3–10)
NEUTS SEG # BLD: 7.1 K/UL (ref 1.8–8)
NEUTS SEG NFR BLD AUTO: 61 % (ref 40–73)
PLATELET # BLD AUTO: 446 K/UL (ref 135–420)
PMV BLD AUTO: 10.4 FL (ref 9.2–11.8)
POTASSIUM SERPL-SCNC: 4.9 MMOL/L (ref 3.5–5.5)
PROT SERPL-MCNC: 7.4 G/DL (ref 6.4–8.2)
RBC # BLD AUTO: 4.43 M/UL (ref 4.2–5.3)
SODIUM SERPL-SCNC: 137 MMOL/L (ref 136–145)
SPECIMEN EXP DATE BLD: NORMAL
WBC # BLD AUTO: 11.8 K/UL (ref 4.6–13.2)

## 2017-06-26 PROCEDURE — 85025 COMPLETE CBC W/AUTO DIFF WBC: CPT | Performed by: SPECIALIST

## 2017-06-26 PROCEDURE — 83036 HEMOGLOBIN GLYCOSYLATED A1C: CPT | Performed by: SPECIALIST

## 2017-06-26 PROCEDURE — 80053 COMPREHEN METABOLIC PANEL: CPT | Performed by: SPECIALIST

## 2017-06-26 PROCEDURE — 86900 BLOOD TYPING SEROLOGIC ABO: CPT | Performed by: SPECIALIST

## 2017-06-26 PROCEDURE — 36415 COLL VENOUS BLD VENIPUNCTURE: CPT | Performed by: SPECIALIST

## 2017-06-26 PROCEDURE — 93005 ELECTROCARDIOGRAM TRACING: CPT

## 2017-06-27 LAB
ATRIAL RATE: 91 BPM
CALCULATED P AXIS, ECG09: 29 DEGREES
CALCULATED R AXIS, ECG10: 20 DEGREES
CALCULATED T AXIS, ECG11: 39 DEGREES
DIAGNOSIS, 93000: NORMAL
P-R INTERVAL, ECG05: 156 MS
Q-T INTERVAL, ECG07: 374 MS
QRS DURATION, ECG06: 86 MS
QTC CALCULATION (BEZET), ECG08: 460 MS
VENTRICULAR RATE, ECG03: 91 BPM

## 2017-06-30 RX ORDER — OXYCODONE AND ACETAMINOPHEN 5; 325 MG/1; MG/1
1 TABLET ORAL
Qty: 30 TAB | Refills: 0 | Status: SHIPPED | OUTPATIENT
Start: 2017-06-30 | End: 2017-07-25

## 2017-06-30 RX ORDER — PHENOL/SODIUM PHENOLATE
20 AEROSOL, SPRAY (ML) MUCOUS MEMBRANE DAILY
Qty: 30 TAB | Refills: 1 | Status: SHIPPED | OUTPATIENT
Start: 2017-06-30 | End: 2017-07-25

## 2017-07-03 ENCOUNTER — OFFICE VISIT (OUTPATIENT)
Dept: SURGERY | Age: 42
End: 2017-07-03

## 2017-07-03 DIAGNOSIS — E66.01 MORBID OBESITY WITH BODY MASS INDEX OF 40.0-49.9 (HCC): Primary | ICD-10-CM

## 2017-07-03 NOTE — PROGRESS NOTES
Appears to have a good understanding of the diet progression, food choices, and dietary/exercise habits for successful weight loss and nourishment after surgery. The class material included: post-op diet progression, including liquid, pureed, and low fat, low sugar food recommendations; proper food group choices, and encouraging dietary and exercise habits that lead to weight loss success.      Edmundo Colón RD

## 2017-07-05 ENCOUNTER — OFFICE VISIT (OUTPATIENT)
Dept: SURGERY | Age: 42
End: 2017-07-05

## 2017-07-05 VITALS
SYSTOLIC BLOOD PRESSURE: 131 MMHG | RESPIRATION RATE: 16 BRPM | BODY MASS INDEX: 41.15 KG/M2 | WEIGHT: 247 LBS | HEART RATE: 98 BPM | DIASTOLIC BLOOD PRESSURE: 76 MMHG | OXYGEN SATURATION: 100 % | HEIGHT: 65 IN

## 2017-07-05 DIAGNOSIS — I10 ESSENTIAL HYPERTENSION: ICD-10-CM

## 2017-07-05 DIAGNOSIS — E11.9 TYPE 2 DIABETES MELLITUS WITHOUT COMPLICATION, WITHOUT LONG-TERM CURRENT USE OF INSULIN (HCC): ICD-10-CM

## 2017-07-05 DIAGNOSIS — Z87.891 SMOKING HISTORY: ICD-10-CM

## 2017-07-05 DIAGNOSIS — E66.01 MORBID OBESITY WITH BODY MASS INDEX OF 40.0-49.9 (HCC): ICD-10-CM

## 2017-07-05 DIAGNOSIS — E66.01 MORBID OBESITY DUE TO EXCESS CALORIES (HCC): Primary | ICD-10-CM

## 2017-07-05 DIAGNOSIS — G47.30 SLEEP APNEA, UNSPECIFIED TYPE: ICD-10-CM

## 2017-07-05 DIAGNOSIS — E28.2 PCOS (POLYCYSTIC OVARIAN SYNDROME): ICD-10-CM

## 2017-07-05 DIAGNOSIS — E78.00 HYPERCHOLESTEROLEMIA: ICD-10-CM

## 2017-07-05 NOTE — PROGRESS NOTES
Gastric Bypass - History and Physical    Subjective: The patient is a 37 y.o. obese female with a Body mass index is 41.1 kg/(m^2). .   she presents now to review their   work up to date to see if they are a candidate for surgery and whether or not to proceed with the previously requested procedure. Bariatric comorbidities continue to include:   Patient Active Problem List   Diagnosis Code    Morbid obesity ([facility] E66.01    Morbid obesity with body mass index of 40.0-49.9 [facility] E66.01    Diabetes mellitus ([facility] E11.9    Hypertension I10    Hypercholesterolemia E78.00    Smoking F17.200    Depression F32.9    Sleep apnea G47.30    Irregular menses N92.6    PCOS (polycystic ovarian syndrome) E28.2    Psoriasis L40.9       They have been generally well prior to this visit and have had no recent significant illnesses. The patient has had no gastrointestinal issues   that would preclude them from proceeding with the surgery they have chosen. Enedina Fraire has recently tried a preoperative weight loss   program  in addition to seeing a bariatric nutritionist preoperatively. We have discussed on at least one other occasion about the various   types of surgical weight loss procedures and they have considered these options after our initial consultation. We have once again discussed   these procedures in detail and they have now decided on a surgical procedure. They present today to discuss this and confirm that their   evaluation pre operatively is acceptable to continue with surgery. The patient desires laparoscopic gastric bypass surgery for surgical weight loss. The patients goal weight is 140lb. These goals are consistent with expected outcomes of their desired operation. her Medical goals are resolution of these health issues.     Patient Active Problem List    Diagnosis Date Noted    Psoriasis     Morbid obesity ([facility]     Morbid obesity with body mass index of 40.0-49.9 ([facility]     Diabetes mellitus ([address]     Hypertension     Hypercholesterolemia     Smoking     Depression     Sleep apnea     Irregular menses     PCOS (polycystic ovarian syndrome)       Past Surgical History:   Procedure Laterality Date    HX  SECTION      x2    HX KNEE ARTHROSCOPY Right     HX TONSILLECTOMY      HX TUBAL LIGATION        Social History   Substance Use Topics    Smoking status: Former Smoker     Years: 0.10     Types: Cigarettes     Quit date: 2017    Smokeless tobacco: Never Used    Alcohol use No      Family History   Problem Relation Age of Onset    Hypertension Father     Arthritis-osteo Father       Current Outpatient Prescriptions   Medication Sig Dispense Refill    oxyCODONE-acetaminophen (PERCOCET) 5-325 mg per tablet Take 1 Tab by mouth every four (4) hours as needed for Pain. Max Daily Amount: 6 Tabs. 30 Tab 0    Omeprazole delayed release (PRILOSEC D/R) 20 mg tablet Take 1 Tab by mouth daily. OTC 30 Tab 1    Liraglutide (VICTOZA) 0.6 mg/0.1 mL (18 mg/3 mL) sub-q pen 1.8 mg by SubCUTAneous route daily.  valsartan-hydroCHLOROthiazide (DIOVAN-HCT) 160-12.5 mg per tablet Take 1 Tab by mouth nightly.  metFORMIN (GLUCOPHAGE) 1,000 mg tablet Take 1,000 mg by mouth two (2) times daily (with meals).  atorvastatin (LIPITOR) 20 mg tablet Take 20 mg by mouth nightly.  multivitamin (ONE A DAY) tablet Take 1 Tab by mouth daily.  labetalol (NORMODYNE) 100 mg tablet Take 100 mg by mouth two (2) times a day. Indications: hypertension      varenicline (CHANTIX) 1 mg tablet Take 1 mg by mouth two (2) times daily (after meals).  DOCOSAHEXANOIC ACID/EPA (FISH OIL PO) Take 2 Caps by mouth nightly.        No Known Allergies       Review of Systems:          General - No history or complaints of unexpected fever, chills, or weight loss  Head/Neck - No history or complaints of headache, diplopia, dysphagia, hearing loss  Cardiac - No history or complaints of chest pain, palpitations, murmur, or shortness of breath  Pulmonary - No history or complaints of shortness of breath, productive cough, hemoptysis  Gastrointestinal - minimal reflux,no  abdominal pain, obstipation/constipation or blood per rectum  Genitourinary - No history or complaints of hematuria/dysuria, stress urinary incontinence symptoms, or renal lithiasis  Musculoskeletal - mild joint pain in their knees,  no muscular weakness  Hematologic - No history or complaints of bleeding disorders,  No blood transfusions  Neurologic - No history or complaints of  migraine headaches, seizure activity, syncopal episodes, TIA or stroke  Integumentary - No history or complaints of rashes, abnormal nevi, skin cancer  Gynecological - sporadic menses             Objective:     Visit Vitals    /76 (BP 1 Location: Left arm, BP Patient Position: Sitting)    Pulse 98    Resp 16    Ht 5' 5\" (1.651 m)    Wt 112 kg (247 lb)    LMP 06/03/2017 (Approximate)    SpO2 100%    BMI 41.1 kg/m2       Physical Examination: General appearance - alert, well appearing, and in no distress and oriented to person, place, and time  Mental status - alert, oriented to person, place, and time, normal mood, behavior, speech, dress, motor activity, and thought processes  Eyes - pupils equal and reactive, extraocular eye movements intact, sclera anicteric, left eye normal, right eye normal  Ears - right ear normal, left ear normal  Nose - normal and patent, no erythema, discharge or polyps  Mouth - mucous membranes moist, pharynx normal without lesions  Neck - supple, no significant adenopathy  Lymphatics - no palpable lymphadenopathy, no hepatosplenomegaly  Chest - clear to auscultation, no wheezes, rales or rhonchi, symmetric air entry  Heart - normal rate, regular rhythm, normal S1, S2, no murmurs, rubs, clicks or gallops  Abdomen - soft, nontender, nondistended, no masses or organomegaly  Back exam - full range of motion, no tenderness, palpable spasm or pain on motion  Neurological - alert, oriented, normal speech, no focal findings or movement disorder noted  Musculoskeletal - no joint tenderness, deformity or swelling  Extremities - peripheral pulses normal, no pedal edema, no clubbing or cyanosis  Skin - normal coloration and turgor, no rashes, no suspicious skin lesions noted    Labs :     Lab Results   Component Value Date/Time    WBC 11.8 2017 11:00 AM    HGB 13.5 2017 11:00 AM    HCT 39.7 2017 11:00 AM    PLATELET 871  11:00 AM    MCV 89.6 2017 11:00 AM     Lab Results   Component Value Date/Time    Sodium 137 2017 11:00 AM    Potassium 4.9 2017 11:00 AM    Chloride 99 2017 11:00 AM    CO2 27 2017 11:00 AM    Anion gap 11 2017 11:00 AM    Glucose 122 2017 11:00 AM    BUN 10 2017 11:00 AM    Creatinine 0.72 2017 11:00 AM    BUN/Creatinine ratio 14 2017 11:00 AM    GFR est AA >60 2017 11:00 AM    GFR est non-AA >60 2017 11:00 AM    Calcium 9.2 2017 11:00 AM    Bilirubin, total 0.3 2017 11:00 AM    AST (SGOT) 22 2017 11:00 AM    Alk.  phosphatase 37 2017 11:00 AM    Protein, total 7.4 2017 11:00 AM    Albumin 3.6 2017 11:00 AM    Globulin 3.8 2017 11:00 AM    A-G Ratio 0.9 2017 11:00 AM    ALT (SGPT) 35 2017 11:00 AM     No results found for: IRON, FE, TIBC, IBCT, PSAT, FERR  No results found for: FOL, RBCF  No results found for: TALYA Berrios            Cardiac / Pulmonary Evaluation:          [facility] Results:     Patient:Edie Davison                    : 1975  Medical Record Number:[id number]                 PREPROCEDURE DIAGNOSIS: This patient is preoperative for laparoscopic gastric bypass surgeryprocedure with a history of  reflux disease.     POSTPROCEDURE DIAGNOSIS: This patient is preoperative for laparoscopic gastric bypass surgeryprocedure with a history of reflux disease.        PROCEDURES PERFORMED: Upper GI study with barium.     ESTIMATED BLOOD LOSS: None.     SPECIMENS: None.     STATEMENT OF MEDICAL NECESSITY: The patient is a patient with a  longstanding history of obesity. They are now considering the laparoscopic gastric bypass surgeryprocedure as a means of surgical weight control and due to their history of reflux disease and are being assessed preoperatively for such.     DESCRIPTION OF PROCEDURE: The patient was brought to the fluoroscopy unit and  was given thin barium. On swallowing of barium, they were noted to have  normal peristalsis of their esophagus. They had prompt filling of distal  esophagus with tapering into the gastroesophageal junction. There was no evidence of a hiatal hernia present. Contrast then filled the gastric cardia, fundus,body and pre pyloric region with no abnormalities noted. Contrast then exited the pylorus in normal fashion. No obstruction was noted. There was no evidence of reflux noted.     (normal anatomy)     Meng Bhardwaj MD    Assessment:     Morbid obesity with associated comorbidity    Plan:     laparoscopic gastric bypass surgery    This is a 37 y.o. female with a BMI of Body mass index is 41.1 kg/(m^2). and the weight-related co-morbidties as noted above. Vashti Joshua meets the NIH criteria for bariatric surgery based upon the BMI of Body mass index is 41.1 kg/(m^2). and multiple weight-related co-morbidties. Vashti Joshua has elected laparoscopic gastric bypass as her intervention of choice for treatment of morbid obestiy through surgical means secondary to its uniform results,  profound baseline suppression of hunger and pace at which weight is lost.    In the office today, following Edie's history and physical examination, a 40 minute discussion regarding the anatomic alterations for the laparoscopic gastric bypass  was undertaken.  The dietary expectations and the patient  dependent factors for success were thoroughly discussed, to include the need for interval follow-up and long-term dietary changes associated with success. The possible short and long term  complications of the gastric bypass were also discussed, to include but not limited to;death, DVT/PE, staple line leak, bleeding, stricture formation, infection,internal hernia  and pouch dilation. Specific weight related outcomes for success were also discussed with an emphasis on careful and close follow-up with the first year and Dietary behavior modification over the first years as baseline cyclical hunger returns  The patient expressed an understanding of the above factors, and her questions were answered in their entirety. In addition, the patient attended a 1.5 hour power point seminar regarding obesity, surgical weight loss including, adjustable gastric band, gastric bypass, and sleeve gastrectomy. This discussion contrasted the different surgical techniques, mechanisms of actions and expected outcomes, and surgical and medical risks associated with each procedure. During this seminar, there was a long question and answer session where each questions was answered until there were no additional questions. Today, the patient had all of her questions answered and the decision was made today that the patient's preoperative evaluation is acceptable for them  to proceed with bariatric surgery  choosing  gastric bypass as her surgical option. Secondary Diagnoses:     Adult Onset Diabetes - The patient has perla given a very low carbohydrate diet preoperatively along with instructions to monitor their blood sugars on a regular daily basis.  When  their surgery is performed  we will be monitoring the patient with sliding scale insulin and accuchecks.  Based on those values we will determine whether the patient needs a reduction of those medications postoperatively or total removal of those medications on discharge.  We will have the patient continue accuchecks postoperatively while at home also and report to me or their family physician for appropriate adjustments as needed.  The patient also understands that in the event of uncontrolled blood sugar preoperatively that we may choose to postpone their surgery. Hypertension - The patient has a clear understanding of how weight loss improves hypertension as a whole, but also they understand that there is a significant genetic component to this disease process. We will monitor the patients blood pressure while in the hospital and the plan would be to continue those medications postoperatively.  If a diuretic is being used we will stop them on discharge to prevent dehydration particularly with the sleeve gastrectomy and the gastric bypass procedures.  They will be instructed to monitor their blood pressure postoperatively while at home and notify their primary care physician in the event of any significantly high or uncharacteristic readings. Hyperlipidemia - The patient understands that studies show that almost all patient will realize an improvement in their lipid profile with weight loss that occurs with these procedures. They however also understand that hyperlipidemia is a multifactorial disease particularly as it pertains to their genetic background and that there is no guarantee toward cure  of this issue. We will resume their medications immediately postoperatively as this tends to decrease any post operative cardiac events.  The patient will follow up with their family physician in the postoperative period with plans to repeat their lipid panel 2-3 month postoperative for potential adjustment or removal of these medications. Abnormal EKG - The patient has undergone or will undergo a preoperative evaluation by a cardiologist such that they are deemed a reasonable candidate for surgery.  The patient understands that with a history of cardiac disease that there is always an increased risk compared to the average patient. Appropriate recommendations have been followed as recommended by the cardiologist.  The patients ASA will be resumed approximately 1 month postoperatively in a coated form. She will undergo a stress test on Thursday. Smoking Cessation - Today I have counseled the patient extensively regarding smoking cessation for greater than 10 minutes. They have been counseled extensively about the detrimental effects of smoking on their weight loss surgical procedure particularly for the gastric bypass and sleeve gastrectomy procedures. They understand that smoking leads to pulmonary issues postoperatively and can lead to gastric ulcers and marginal ulcers in the post bariatric surgery pouch that has been created. They understand that they must stop smoking 1 month at least prior to surgery or it may affect their ultimate progression to their procedure. They understand finally that labs may be obtained to prove that they have ceased smoking prior to surgery. Total time counseling was greater than 10 minutes. Obstructive Sleep Apnea -The patient understands the association of sleep apnea and obesity and the additional risk that it caries related to post surgical complications. If they have not been tested for sleep apnea and I feel they are at increased risk for this diagnosis, then they will be scheduled for a consultation with a Pulmonologist for such. In the event that they lavon this diagnosis we will have the patient bring their CPAP machine to the hospital for use both postoperatively in the PACU and on the floor at its appropriate setting.  We will have them continue using it while at home after surgery and follow up with their pulmonologist 6 months after to be retested to see if it can be discontinued at that time period.         Signed By: Christina Montelongo MD     July 5, 2017

## 2017-07-05 NOTE — PATIENT INSTRUCTIONS
Body Mass Index: Care Instructions  Your Care Instructions    Body mass index (BMI) can help you see if your weight is raising your risk for health problems. It uses a formula to compare how much you weigh with how tall you are. · A BMI lower than 18.5 is considered underweight. · A BMI between 18.5 and 24.9 is considered healthy. · A BMI between 25 and 29.9 is considered overweight. A BMI of 30 or higher is considered obese. If your BMI is in the normal range, it means that you have a lower risk for weight-related health problems. If your BMI is in the overweight or obese range, you may be at increased risk for weight-related health problems, such as high blood pressure, heart disease, stroke, arthritis or joint pain, and diabetes. If your BMI is in the underweight range, you may be at increased risk for health problems such as fatigue, lower protection (immunity) against illness, muscle loss, bone loss, hair loss, and hormone problems. BMI is just one measure of your risk for weight-related health problems. You may be at higher risk for health problems if you are not active, you eat an unhealthy diet, or you drink too much alcohol or use tobacco products. Follow-up care is a key part of your treatment and safety. Be sure to make and go to all appointments, and call your doctor if you are having problems. It's also a good idea to know your test results and keep a list of the medicines you take. How can you care for yourself at home? · Practice healthy eating habits. This includes eating plenty of fruits, vegetables, whole grains, lean protein, and low-fat dairy. · If your doctor recommends it, get more exercise. Walking is a good choice. Bit by bit, increase the amount you walk every day. Try for at least 30 minutes on most days of the week. · Do not smoke. Smoking can increase your risk for health problems. If you need help quitting, talk to your doctor about stop-smoking programs and medicines. These can increase your chances of quitting for good. · Limit alcohol to 2 drinks a day for men and 1 drink a day for women. Too much alcohol can cause health problems. If you have a BMI higher than 25  · Your doctor may do other tests to check your risk for weight-related health problems. This may include measuring the distance around your waist. A waist measurement of more than 40 inches in men or 35 inches in women can increase the risk of weight-related health problems. · Talk with your doctor about steps you can take to stay healthy or improve your health. You may need to make lifestyle changes to lose weight and stay healthy, such as changing your diet and getting regular exercise. If you have a BMI lower than 18.5  · Your doctor may do other tests to check your risk for health problems. · Talk with your doctor about steps you can take to stay healthy or improve your health. You may need to make lifestyle changes to gain or maintain weight and stay healthy, such as getting more healthy foods in your diet and doing exercises to build muscle. Where can you learn more? Go to http://mariano-ashlee.info/. Enter S176 in the search box to learn more about \"Body Mass Index: Care Instructions. \"  Current as of: January 23, 2017  Content Version: 11.3  © 8535-3032 [employer]. Care instructions adapted under license by [employer] (which disclaims liability or warranty for this information). If you have questions about a medical condition or this instruction, always ask your healthcare professional. [place] [address] any warranty or liability for your use of this information. Patient Instructions      1. Continue to monitor carbohydrate and protein intake- remember to keep your           total  carbohydrates to 50 grams or less per day for best results.   2. Remember hydration goals - usually 48 to 64 ounces of liquids per day  3. Continue to work towards exercise goals - minimum 3 days per week of 45          minutes to  1 hour at a time. 4. Remember to take vitamins as directed        Supplement Resource Guide    Importance of Protein:   Maintains lean body mass, produces antibodies to fight off infections, heals wounds, minimizes hair loss, helps to give you energy, helps with satiety, and keeping you full between meals. Importance of Calcium:  Needed for healthy bones and teeth, normal blood clotting, and nervous system functioning, higher risk of osteoporosis and bone disease with non-compliance. Importance of Multivitamins: Many functions. Supply you with extra nutrients that you may be missing from food. May lead to iron deficiency anemia, weakness, fatigue, and many other symptoms with non-compliance. Importance of B Vitamins:  Important for red blood cell formation, metabolism, energy, and helps to maintain a healthy nervous system. Protein Supplement  Find one you like now. Use immediately after surgery. Look for:  35-50g protein each day from your protein supplement once you reach the progression diet. 0-3 g fat per serving  0-3 g sugar per serving    Protein drinks should be split in separate dosages. Recommend: Lifelong  1 year + Calcium Supplement:     Start taking within a month after surgery. Look for: Calcium Citrate Plus D (1500 mg per day)  Recommend: Citracal     .          Avoid chocolate chewable calcium. Can use chewable bariatric or [employer] brand or similar chewable. The body cannot absorb more than 500-600 mg @ a time. Take for Life Multi-vitamin Supplement:      1st Month After Surgery: Any complete chewable, such as: [place]s Complete chewables. Avoid [place] sours or gummies. They lack iron and other important nutrients and also have added sugar.     Continue with chewable vitamin or change to adult complete multivitamin one month after surgery. Menstruating women can take a prenatal vitamin. Make sure has at least 18 mg iron and 884-853 mcg folic acid):    Vitamin B12, B Complex Vitamin, and Biotin  Start taking within a month after surgery. Vitamin B12:  1000 mcg of Vitamin B12 three times weekly    Must take sublingually (meaning you take it under your tongue) or in a liquid drop form for easy absorption. B Complex Vitamin: Take a pill or liquid drop form once daily. Biotin: This vitamin can help prevent hair loss.     Recommend 5mg   (5000 mcg) a day  Biotin is Optional

## 2017-07-06 ENCOUNTER — TELEPHONE (OUTPATIENT)
Dept: SURGERY | Age: 42
End: 2017-07-06

## 2017-07-06 NOTE — TELEPHONE ENCOUNTER
Called MsPeggy Albert Handing on 7-6-17 to see if she was able to get a sooner appointment for her cardiac work up and pt stated she had not been contacted. Advised pt I would call Dr. Liborio Ramírez office to check status and call her back. I called Dr. Liborio Ramírez office and spoke with the staff and they are not able to get her in until July 13, 2017. On that day she will have and Echo and Stress test.    Called Ms. Price Handing back to make her aware that her surgery was being cancelled until we had the cardiac work up and could be cleared. She was okay with this and understood these directions.

## 2017-07-17 DIAGNOSIS — Z01.812 BLOOD TESTS PRIOR TO TREATMENT OR PROCEDURE: ICD-10-CM

## 2017-07-17 DIAGNOSIS — E66.01 MORBID OBESITY DUE TO EXCESS CALORIES (HCC): ICD-10-CM

## 2017-07-17 DIAGNOSIS — E11.9 TYPE 2 DIABETES MELLITUS WITHOUT COMPLICATION, WITHOUT LONG-TERM CURRENT USE OF INSULIN (HCC): Primary | ICD-10-CM

## 2017-07-17 DIAGNOSIS — I10 ESSENTIAL HYPERTENSION: ICD-10-CM

## 2017-08-07 ENCOUNTER — HOSPITAL ENCOUNTER (OUTPATIENT)
Dept: PREADMISSION TESTING | Age: 42
Discharge: HOME | End: 2017-08-07
Attending: SPECIALIST
Payer: COMMERCIAL

## 2017-08-07 LAB
ABO + RH BLD: NORMAL
ALBUMIN SERPL BCP-MCNC: 3.5 G/DL (ref 3.4–5)
ALBUMIN/GLOB SERPL: 0.8 {RATIO} (ref 0.8–1.7)
ALP SERPL-CCNC: 39 U/L (ref 45–117)
ALT SERPL-CCNC: 38 U/L (ref 13–56)
ANION GAP BLD CALC-SCNC: 12 MMOL/L (ref 3–18)
AST SERPL W P-5'-P-CCNC: 23 U/L (ref 15–37)
BASOPHILS # BLD AUTO: 0 K/UL (ref 0–0.06)
BASOPHILS # BLD: 0 % (ref 0–2)
BILIRUB SERPL-MCNC: 0.3 MG/DL (ref 0.2–1)
BLOOD GROUP ANTIBODIES SERPL: NORMAL
BUN SERPL-MCNC: 8 MG/DL (ref 7–18)
BUN/CREAT SERPL: 11 (ref 12–20)
CALCIUM SERPL-MCNC: 8.9 MG/DL (ref 8.5–10.1)
CHLORIDE SERPL-SCNC: 100 MMOL/L (ref 100–108)
CO2 SERPL-SCNC: 26 MMOL/L (ref 21–32)
CREAT SERPL-MCNC: 0.75 MG/DL (ref 0.6–1.3)
DIFFERENTIAL METHOD BLD: NORMAL
EOSINOPHIL # BLD: 0.3 K/UL (ref 0–0.4)
EOSINOPHIL NFR BLD: 2 % (ref 0–5)
ERYTHROCYTE [DISTWIDTH] IN BLOOD BY AUTOMATED COUNT: 12.3 % (ref 11.6–14.5)
EST. AVERAGE GLUCOSE BLD GHB EST-MCNC: 160 MG/DL
GLOBULIN SER CALC-MCNC: 4.2 G/DL (ref 2–4)
GLUCOSE SERPL-MCNC: 113 MG/DL (ref 74–99)
HBA1C MFR BLD: 7.2 % (ref 4.5–5.6)
HCT VFR BLD AUTO: 38.9 % (ref 35–45)
HGB BLD-MCNC: 13.3 G/DL (ref 12–16)
LYMPHOCYTES # BLD AUTO: 29 % (ref 21–52)
LYMPHOCYTES # BLD: 3.3 K/UL (ref 0.9–3.6)
MCH RBC QN AUTO: 30.2 PG (ref 24–34)
MCHC RBC AUTO-ENTMCNC: 34.2 G/DL (ref 31–37)
MCV RBC AUTO: 88.4 FL (ref 74–97)
MONOCYTES # BLD: 0.7 K/UL (ref 0.05–1.2)
MONOCYTES NFR BLD AUTO: 6 % (ref 3–10)
NEUTS SEG # BLD: 7.1 K/UL (ref 1.8–8)
NEUTS SEG NFR BLD AUTO: 63 % (ref 40–73)
PLATELET # BLD AUTO: 414 K/UL (ref 135–420)
PMV BLD AUTO: 10.4 FL (ref 9.2–11.8)
POTASSIUM SERPL-SCNC: 4.2 MMOL/L (ref 3.5–5.5)
PROT SERPL-MCNC: 7.7 G/DL (ref 6.4–8.2)
RBC # BLD AUTO: 4.4 M/UL (ref 4.2–5.3)
SODIUM SERPL-SCNC: 138 MMOL/L (ref 136–145)
SPECIMEN EXP DATE BLD: NORMAL
WBC # BLD AUTO: 11.5 K/UL (ref 4.6–13.2)

## 2017-08-07 PROCEDURE — 36415 COLL VENOUS BLD VENIPUNCTURE: CPT | Performed by: SPECIALIST

## 2017-08-07 PROCEDURE — 85025 COMPLETE CBC W/AUTO DIFF WBC: CPT | Performed by: SPECIALIST

## 2017-08-07 PROCEDURE — 83036 HEMOGLOBIN GLYCOSYLATED A1C: CPT | Performed by: SPECIALIST

## 2017-08-07 PROCEDURE — 80053 COMPREHEN METABOLIC PANEL: CPT | Performed by: SPECIALIST

## 2017-08-07 PROCEDURE — 86900 BLOOD TYPING SEROLOGIC ABO: CPT | Performed by: SPECIALIST

## 2017-08-14 ENCOUNTER — ANESTHESIA EVENT (OUTPATIENT)
Dept: SURGERY | Age: 42
DRG: 621 | End: 2017-08-14
Payer: COMMERCIAL

## 2017-08-15 ENCOUNTER — ANESTHESIA (OUTPATIENT)
Dept: SURGERY | Age: 42
DRG: 621 | End: 2017-08-15
Payer: COMMERCIAL

## 2017-08-15 ENCOUNTER — HOSPITAL ENCOUNTER (INPATIENT)
Age: 42
LOS: 1 days | Discharge: HOME | DRG: 621 | End: 2017-08-16
Attending: SPECIALIST | Admitting: SPECIALIST
Payer: COMMERCIAL

## 2017-08-15 PROBLEM — E66.01 MORBID OBESITY WITH BMI OF 40.0-44.9, ADULT (HCC): Status: ACTIVE | Noted: 2017-08-15

## 2017-08-15 LAB
GLUCOSE BLD STRIP.AUTO-MCNC: 131 MG/DL (ref 70–110)
GLUCOSE BLD STRIP.AUTO-MCNC: 137 MG/DL (ref 70–110)
GLUCOSE BLD STRIP.AUTO-MCNC: 160 MG/DL (ref 70–110)
GLUCOSE BLD STRIP.AUTO-MCNC: 168 MG/DL (ref 70–110)
GLUCOSE BLD STRIP.AUTO-MCNC: 170 MG/DL (ref 70–110)
HCG SERPL QL: NEGATIVE

## 2017-08-15 PROCEDURE — 77030034850: Performed by: SPECIALIST

## 2017-08-15 PROCEDURE — 77030008603 HC TRCR ENDOSC EPATH J&J -C: Performed by: SPECIALIST

## 2017-08-15 PROCEDURE — 77030008683 HC TU ET CUF COVD -A: Performed by: ANESTHESIOLOGY

## 2017-08-15 PROCEDURE — 77030010030: Performed by: SPECIALIST

## 2017-08-15 PROCEDURE — 77030036598 HC CARTDRG STPL RELD ECHELON FLX J&J -D: Performed by: SPECIALIST

## 2017-08-15 PROCEDURE — 74011636637 HC RX REV CODE- 636/637: Performed by: ANESTHESIOLOGY

## 2017-08-15 PROCEDURE — 74011250636 HC RX REV CODE- 250/636

## 2017-08-15 PROCEDURE — 77030002912 HC SUT ETHBND J&J -A: Performed by: SPECIALIST

## 2017-08-15 PROCEDURE — 77030002986 HC SUT PROL J&J -A: Performed by: SPECIALIST

## 2017-08-15 PROCEDURE — 77030003580 HC NDL INSUF VERES J&J -B: Performed by: SPECIALIST

## 2017-08-15 PROCEDURE — 0FB04ZX EXCISION OF LIVER, PERCUTANEOUS ENDOSCOPIC APPROACH, DIAGNOSTIC: ICD-10-PCS | Performed by: SPECIALIST

## 2017-08-15 PROCEDURE — 77030020407 HC IV BLD WRMR ST 3M -A: Performed by: ANESTHESIOLOGY

## 2017-08-15 PROCEDURE — 77030002966 HC SUT PDS J&J -A: Performed by: SPECIALIST

## 2017-08-15 PROCEDURE — 36415 COLL VENOUS BLD VENIPUNCTURE: CPT | Performed by: SPECIALIST

## 2017-08-15 PROCEDURE — 77030036732 HC RELD STPLR VASC J&J -F: Performed by: SPECIALIST

## 2017-08-15 PROCEDURE — 77030012893: Performed by: SPECIALIST

## 2017-08-15 PROCEDURE — 77030020255 HC SOL INJ LR 1000ML BG: Performed by: SPECIALIST

## 2017-08-15 PROCEDURE — 77030002935 HC SUT MCRYL J&J -C: Performed by: SPECIALIST

## 2017-08-15 PROCEDURE — 77010033678 HC OXYGEN DAILY

## 2017-08-15 PROCEDURE — 77030027876 HC STPLR ENDOSC FLX PWR J&J -G1: Performed by: SPECIALIST

## 2017-08-15 PROCEDURE — 77030018004 HC RELD STPLR ENDOSC1 J&J -C: Performed by: SPECIALIST

## 2017-08-15 PROCEDURE — 88313 SPECIAL STAINS GROUP 2: CPT | Performed by: SPECIALIST

## 2017-08-15 PROCEDURE — 77030009426 HC FCPS BIOP ENDOSC BSC -B: Performed by: SPECIALIST

## 2017-08-15 PROCEDURE — 77030009851 HC PCH RTVR ENDOSC AMR -B: Performed by: SPECIALIST

## 2017-08-15 PROCEDURE — 77030018836 HC SOL IRR NACL ICUM -A: Performed by: SPECIALIST

## 2017-08-15 PROCEDURE — 74011250636 HC RX REV CODE- 250/636: Performed by: SPECIALIST

## 2017-08-15 PROCEDURE — 77030002916 HC SUT ETHLN J&J -A: Performed by: SPECIALIST

## 2017-08-15 PROCEDURE — 77030005264 HC CATH JEJU BKR BARD -D: Performed by: SPECIALIST

## 2017-08-15 PROCEDURE — 88307 TISSUE EXAM BY PATHOLOGIST: CPT | Performed by: SPECIALIST

## 2017-08-15 PROCEDURE — 77030012407 HC DRN WND BARD -B: Performed by: SPECIALIST

## 2017-08-15 PROCEDURE — 76060000035 HC ANESTHESIA 2 TO 2.5 HR: Performed by: SPECIALIST

## 2017-08-15 PROCEDURE — 88342 IMHCHEM/IMCYTCHM 1ST ANTB: CPT | Performed by: SPECIALIST

## 2017-08-15 PROCEDURE — 74011000250 HC RX REV CODE- 250

## 2017-08-15 PROCEDURE — 77030022585 HC SEAL FBRN EVICEL J&J -F: Performed by: SPECIALIST

## 2017-08-15 PROCEDURE — 74011636637 HC RX REV CODE- 636/637: Performed by: SPECIALIST

## 2017-08-15 PROCEDURE — 77030034154 HC SHR COAG HARM ACE J&J -F: Performed by: SPECIALIST

## 2017-08-15 PROCEDURE — 77030025303 HC STPLR ENDOSC J&J -G: Performed by: SPECIALIST

## 2017-08-15 PROCEDURE — 77030013567 HC DRN WND RESERV BARD -A: Performed by: SPECIALIST

## 2017-08-15 PROCEDURE — 76210000006 HC OR PH I REC 0.5 TO 1 HR: Performed by: SPECIALIST

## 2017-08-15 PROCEDURE — 77030020782 HC GWN BAIR PAWS FLX 3M -B: Performed by: SPECIALIST

## 2017-08-15 PROCEDURE — 84703 CHORIONIC GONADOTROPIN ASSAY: CPT | Performed by: SPECIALIST

## 2017-08-15 PROCEDURE — 77030027138 HC INCENT SPIROMETER -A

## 2017-08-15 PROCEDURE — 77030006643: Performed by: ANESTHESIOLOGY

## 2017-08-15 PROCEDURE — 77030032490 HC SLV COMPR SCD KNE COVD -B: Performed by: SPECIALIST

## 2017-08-15 PROCEDURE — 88305 TISSUE EXAM BY PATHOLOGIST: CPT | Performed by: SPECIALIST

## 2017-08-15 PROCEDURE — 77030002896 HC SUT CLP ABSRB J&J -B: Performed by: SPECIALIST

## 2017-08-15 PROCEDURE — 0DB68ZX EXCISION OF STOMACH, VIA NATURAL OR ARTIFICIAL OPENING ENDOSCOPIC, DIAGNOSTIC: ICD-10-PCS | Performed by: SPECIALIST

## 2017-08-15 PROCEDURE — 0D164ZA BYPASS STOMACH TO JEJUNUM, PERCUTANEOUS ENDOSCOPIC APPROACH: ICD-10-PCS | Performed by: SPECIALIST

## 2017-08-15 PROCEDURE — 65270000029 HC RM PRIVATE

## 2017-08-15 PROCEDURE — 74011250637 HC RX REV CODE- 250/637: Performed by: SPECIALIST

## 2017-08-15 PROCEDURE — 82962 GLUCOSE BLOOD TEST: CPT

## 2017-08-15 PROCEDURE — 76010000131 HC OR TIME 2 TO 2.5 HR: Performed by: SPECIALIST

## 2017-08-15 PROCEDURE — 77030010515 HC APPL ENDOCLP LIG J&J -B: Performed by: SPECIALIST

## 2017-08-15 PROCEDURE — 74011000250 HC RX REV CODE- 250: Performed by: SPECIALIST

## 2017-08-15 PROCEDURE — 77030008477 HC STYL SATN SLP COVD -A: Performed by: ANESTHESIOLOGY

## 2017-08-15 RX ORDER — INSULIN LISPRO 100 [IU]/ML
INJECTION, SOLUTION INTRAVENOUS; SUBCUTANEOUS EVERY 6 HOURS
Status: DISCONTINUED | OUTPATIENT
Start: 2017-08-15 | End: 2017-08-16 | Stop reason: HOSPADM

## 2017-08-15 RX ORDER — HYDROMORPHONE HYDROCHLORIDE 1 MG/ML
0.5 INJECTION, SOLUTION INTRAMUSCULAR; INTRAVENOUS; SUBCUTANEOUS
Status: DISCONTINUED | OUTPATIENT
Start: 2017-08-15 | End: 2017-08-15 | Stop reason: HOSPADM

## 2017-08-15 RX ORDER — INSULIN LISPRO 100 [IU]/ML
INJECTION, SOLUTION INTRAVENOUS; SUBCUTANEOUS ONCE
Status: COMPLETED | OUTPATIENT
Start: 2017-08-15 | End: 2017-08-15

## 2017-08-15 RX ORDER — ROCURONIUM BROMIDE 10 MG/ML
INJECTION, SOLUTION INTRAVENOUS AS NEEDED
Status: DISCONTINUED | OUTPATIENT
Start: 2017-08-15 | End: 2017-08-15 | Stop reason: HOSPADM

## 2017-08-15 RX ORDER — BUPIVACAINE HYDROCHLORIDE AND EPINEPHRINE 5; 5 MG/ML; UG/ML
INJECTION, SOLUTION EPIDURAL; INTRACAUDAL; PERINEURAL AS NEEDED
Status: DISCONTINUED | OUTPATIENT
Start: 2017-08-15 | End: 2017-08-15 | Stop reason: HOSPADM

## 2017-08-15 RX ORDER — SODIUM CHLORIDE, SODIUM LACTATE, POTASSIUM CHLORIDE, CALCIUM CHLORIDE 600; 310; 30; 20 MG/100ML; MG/100ML; MG/100ML; MG/100ML
150 INJECTION, SOLUTION INTRAVENOUS CONTINUOUS
Status: DISCONTINUED | OUTPATIENT
Start: 2017-08-15 | End: 2017-08-16 | Stop reason: HOSPADM

## 2017-08-15 RX ORDER — CEFAZOLIN SODIUM 2 G/50ML
2 SOLUTION INTRAVENOUS ONCE
Status: COMPLETED | OUTPATIENT
Start: 2017-08-15 | End: 2017-08-15

## 2017-08-15 RX ORDER — NYSTATIN 100000 [USP'U]/ML
500000 SUSPENSION ORAL
Status: COMPLETED | OUTPATIENT
Start: 2017-08-15 | End: 2017-08-15

## 2017-08-15 RX ORDER — SODIUM CHLORIDE 0.9 % (FLUSH) 0.9 %
5-10 SYRINGE (ML) INJECTION AS NEEDED
Status: DISCONTINUED | OUTPATIENT
Start: 2017-08-15 | End: 2017-08-15 | Stop reason: HOSPADM

## 2017-08-15 RX ORDER — LIDOCAINE HYDROCHLORIDE 20 MG/ML
INJECTION, SOLUTION EPIDURAL; INFILTRATION; INTRACAUDAL; PERINEURAL AS NEEDED
Status: DISCONTINUED | OUTPATIENT
Start: 2017-08-15 | End: 2017-08-15 | Stop reason: HOSPADM

## 2017-08-15 RX ORDER — MIDAZOLAM HYDROCHLORIDE 1 MG/ML
INJECTION, SOLUTION INTRAMUSCULAR; INTRAVENOUS AS NEEDED
Status: DISCONTINUED | OUTPATIENT
Start: 2017-08-15 | End: 2017-08-15 | Stop reason: HOSPADM

## 2017-08-15 RX ORDER — ONDANSETRON 2 MG/ML
INJECTION INTRAMUSCULAR; INTRAVENOUS AS NEEDED
Status: DISCONTINUED | OUTPATIENT
Start: 2017-08-15 | End: 2017-08-15 | Stop reason: HOSPADM

## 2017-08-15 RX ORDER — ACETAMINOPHEN 10 MG/ML
1000 INJECTION, SOLUTION INTRAVENOUS ONCE
Status: DISCONTINUED | OUTPATIENT
Start: 2017-08-15 | End: 2017-08-15 | Stop reason: HOSPADM

## 2017-08-15 RX ORDER — FAMOTIDINE 20 MG/50ML
20 INJECTION, SOLUTION INTRAVENOUS ONCE
Status: DISCONTINUED | OUTPATIENT
Start: 2017-08-15 | End: 2017-08-15 | Stop reason: RX

## 2017-08-15 RX ORDER — MAGNESIUM SULFATE 100 %
4 CRYSTALS MISCELLANEOUS AS NEEDED
Status: DISCONTINUED | OUTPATIENT
Start: 2017-08-15 | End: 2017-08-15 | Stop reason: HOSPADM

## 2017-08-15 RX ORDER — DEXTROSE 50 % IN WATER (D50W) INTRAVENOUS SYRINGE
25-50 AS NEEDED
Status: DISCONTINUED | OUTPATIENT
Start: 2017-08-15 | End: 2017-08-15 | Stop reason: HOSPADM

## 2017-08-15 RX ORDER — KETOROLAC TROMETHAMINE 30 MG/ML
30 INJECTION, SOLUTION INTRAMUSCULAR; INTRAVENOUS EVERY 6 HOURS
Status: DISCONTINUED | OUTPATIENT
Start: 2017-08-15 | End: 2017-08-16 | Stop reason: HOSPADM

## 2017-08-15 RX ORDER — SODIUM CHLORIDE, SODIUM LACTATE, POTASSIUM CHLORIDE, CALCIUM CHLORIDE 600; 310; 30; 20 MG/100ML; MG/100ML; MG/100ML; MG/100ML
125 INJECTION, SOLUTION INTRAVENOUS CONTINUOUS
Status: DISCONTINUED | OUTPATIENT
Start: 2017-08-15 | End: 2017-08-15 | Stop reason: HOSPADM

## 2017-08-15 RX ORDER — ONDANSETRON 2 MG/ML
4 INJECTION INTRAMUSCULAR; INTRAVENOUS
Status: DISCONTINUED | OUTPATIENT
Start: 2017-08-15 | End: 2017-08-16 | Stop reason: HOSPADM

## 2017-08-15 RX ORDER — GLYCOPYRROLATE 0.2 MG/ML
INJECTION INTRAMUSCULAR; INTRAVENOUS AS NEEDED
Status: DISCONTINUED | OUTPATIENT
Start: 2017-08-15 | End: 2017-08-15 | Stop reason: HOSPADM

## 2017-08-15 RX ORDER — CEFAZOLIN SODIUM 2 G/50ML
2 SOLUTION INTRAVENOUS EVERY 8 HOURS
Status: COMPLETED | OUTPATIENT
Start: 2017-08-15 | End: 2017-08-15

## 2017-08-15 RX ORDER — DIPHENHYDRAMINE HYDROCHLORIDE 50 MG/ML
25 INJECTION, SOLUTION INTRAMUSCULAR; INTRAVENOUS
Status: DISCONTINUED | OUTPATIENT
Start: 2017-08-15 | End: 2017-08-16 | Stop reason: HOSPADM

## 2017-08-15 RX ORDER — PROPOFOL 10 MG/ML
INJECTION, EMULSION INTRAVENOUS AS NEEDED
Status: DISCONTINUED | OUTPATIENT
Start: 2017-08-15 | End: 2017-08-15 | Stop reason: HOSPADM

## 2017-08-15 RX ORDER — EPHEDRINE SULFATE/0.9% NACL/PF 25 MG/5 ML
SYRINGE (ML) INTRAVENOUS AS NEEDED
Status: DISCONTINUED | OUTPATIENT
Start: 2017-08-15 | End: 2017-08-15 | Stop reason: HOSPADM

## 2017-08-15 RX ORDER — ACETAMINOPHEN 10 MG/ML
1000 INJECTION, SOLUTION INTRAVENOUS EVERY 6 HOURS
Status: COMPLETED | OUTPATIENT
Start: 2017-08-15 | End: 2017-08-15

## 2017-08-15 RX ORDER — ENOXAPARIN SODIUM 100 MG/ML
40 INJECTION SUBCUTANEOUS ONCE
Status: COMPLETED | OUTPATIENT
Start: 2017-08-15 | End: 2017-08-15

## 2017-08-15 RX ORDER — METOCLOPRAMIDE HYDROCHLORIDE 5 MG/ML
INJECTION INTRAMUSCULAR; INTRAVENOUS AS NEEDED
Status: DISCONTINUED | OUTPATIENT
Start: 2017-08-15 | End: 2017-08-15 | Stop reason: HOSPADM

## 2017-08-15 RX ORDER — SODIUM CHLORIDE, SODIUM LACTATE, POTASSIUM CHLORIDE, CALCIUM CHLORIDE 600; 310; 30; 20 MG/100ML; MG/100ML; MG/100ML; MG/100ML
125 INJECTION, SOLUTION INTRAVENOUS CONTINUOUS
Status: DISCONTINUED | OUTPATIENT
Start: 2017-08-15 | End: 2017-08-15

## 2017-08-15 RX ORDER — HYDROMORPHONE HYDROCHLORIDE 1 MG/ML
0.5 INJECTION, SOLUTION INTRAMUSCULAR; INTRAVENOUS; SUBCUTANEOUS
Status: DISCONTINUED | OUTPATIENT
Start: 2017-08-15 | End: 2017-08-16

## 2017-08-15 RX ORDER — NEOSTIGMINE METHYLSULFATE 5 MG/5 ML
SYRINGE (ML) INTRAVENOUS AS NEEDED
Status: DISCONTINUED | OUTPATIENT
Start: 2017-08-15 | End: 2017-08-15 | Stop reason: HOSPADM

## 2017-08-15 RX ORDER — ENOXAPARIN SODIUM 100 MG/ML
40 INJECTION SUBCUTANEOUS EVERY 12 HOURS
Status: DISCONTINUED | OUTPATIENT
Start: 2017-08-15 | End: 2017-08-16 | Stop reason: HOSPADM

## 2017-08-15 RX ORDER — INSULIN LISPRO 100 [IU]/ML
INJECTION, SOLUTION INTRAVENOUS; SUBCUTANEOUS ONCE
Status: DISCONTINUED | OUTPATIENT
Start: 2017-08-15 | End: 2017-08-15 | Stop reason: HOSPADM

## 2017-08-15 RX ORDER — KETOROLAC TROMETHAMINE 30 MG/ML
INJECTION, SOLUTION INTRAMUSCULAR; INTRAVENOUS AS NEEDED
Status: DISCONTINUED | OUTPATIENT
Start: 2017-08-15 | End: 2017-08-15 | Stop reason: HOSPADM

## 2017-08-15 RX ORDER — FENTANYL CITRATE 50 UG/ML
INJECTION, SOLUTION INTRAMUSCULAR; INTRAVENOUS AS NEEDED
Status: DISCONTINUED | OUTPATIENT
Start: 2017-08-15 | End: 2017-08-15 | Stop reason: HOSPADM

## 2017-08-15 RX ORDER — HYDROMORPHONE HYDROCHLORIDE 1 MG/ML
1 INJECTION, SOLUTION INTRAMUSCULAR; INTRAVENOUS; SUBCUTANEOUS
Status: DISCONTINUED | OUTPATIENT
Start: 2017-08-15 | End: 2017-08-16

## 2017-08-15 RX ORDER — HYDROMORPHONE HYDROCHLORIDE 2 MG/ML
INJECTION, SOLUTION INTRAMUSCULAR; INTRAVENOUS; SUBCUTANEOUS AS NEEDED
Status: DISCONTINUED | OUTPATIENT
Start: 2017-08-15 | End: 2017-08-15 | Stop reason: HOSPADM

## 2017-08-15 RX ORDER — DEXTROSE 50 % IN WATER (D50W) INTRAVENOUS SYRINGE
25-50 AS NEEDED
Status: DISCONTINUED | OUTPATIENT
Start: 2017-08-15 | End: 2017-08-16 | Stop reason: HOSPADM

## 2017-08-15 RX ADMIN — SODIUM CHLORIDE, SODIUM LACTATE, POTASSIUM CHLORIDE, AND CALCIUM CHLORIDE: 600; 310; 30; 20 INJECTION, SOLUTION INTRAVENOUS at 08:23

## 2017-08-15 RX ADMIN — PROPOFOL 200 MG: 10 INJECTION, EMULSION INTRAVENOUS at 07:34

## 2017-08-15 RX ADMIN — INSULIN LISPRO 2 UNITS: 100 INJECTION, SOLUTION INTRAVENOUS; SUBCUTANEOUS at 06:40

## 2017-08-15 RX ADMIN — HYDROMORPHONE HYDROCHLORIDE 0.3 MG: 2 INJECTION, SOLUTION INTRAMUSCULAR; INTRAVENOUS; SUBCUTANEOUS at 08:46

## 2017-08-15 RX ADMIN — METOCLOPRAMIDE HYDROCHLORIDE 10 MG: 5 INJECTION INTRAMUSCULAR; INTRAVENOUS at 09:24

## 2017-08-15 RX ADMIN — Medication 3.5 MG: at 09:29

## 2017-08-15 RX ADMIN — ROCURONIUM BROMIDE 10 MG: 10 INJECTION, SOLUTION INTRAVENOUS at 08:15

## 2017-08-15 RX ADMIN — ONDANSETRON 4 MG: 2 INJECTION INTRAMUSCULAR; INTRAVENOUS at 16:21

## 2017-08-15 RX ADMIN — ENOXAPARIN SODIUM 40 MG: 40 INJECTION SUBCUTANEOUS at 06:31

## 2017-08-15 RX ADMIN — ENOXAPARIN SODIUM 40 MG: 40 INJECTION SUBCUTANEOUS at 19:52

## 2017-08-15 RX ADMIN — FAMOTIDINE 20 MG: 10 INJECTION INTRAVENOUS at 06:35

## 2017-08-15 RX ADMIN — HYDROMORPHONE HYDROCHLORIDE 0.5 MG: 2 INJECTION, SOLUTION INTRAMUSCULAR; INTRAVENOUS; SUBCUTANEOUS at 09:56

## 2017-08-15 RX ADMIN — HYDROMORPHONE HYDROCHLORIDE 0.5 MG: 1 INJECTION, SOLUTION INTRAMUSCULAR; INTRAVENOUS; SUBCUTANEOUS at 16:22

## 2017-08-15 RX ADMIN — KETOROLAC TROMETHAMINE 30 MG: 30 INJECTION, SOLUTION INTRAMUSCULAR at 12:49

## 2017-08-15 RX ADMIN — LIDOCAINE HYDROCHLORIDE 60 MG: 20 INJECTION, SOLUTION EPIDURAL; INFILTRATION; INTRACAUDAL; PERINEURAL at 07:34

## 2017-08-15 RX ADMIN — ONDANSETRON 4 MG: 2 INJECTION INTRAMUSCULAR; INTRAVENOUS at 21:31

## 2017-08-15 RX ADMIN — CEFAZOLIN SODIUM 2 G: 2 SOLUTION INTRAVENOUS at 13:30

## 2017-08-15 RX ADMIN — SODIUM CHLORIDE, SODIUM LACTATE, POTASSIUM CHLORIDE, AND CALCIUM CHLORIDE 150 ML/HR: 600; 310; 30; 20 INJECTION, SOLUTION INTRAVENOUS at 19:53

## 2017-08-15 RX ADMIN — KETOROLAC TROMETHAMINE 30 MG: 30 INJECTION, SOLUTION INTRAMUSCULAR at 19:53

## 2017-08-15 RX ADMIN — ACETAMINOPHEN 1000 MG: 10 INJECTION, SOLUTION INTRAVENOUS at 19:52

## 2017-08-15 RX ADMIN — NYSTATIN 500000 UNITS: 100000 SUSPENSION ORAL at 06:31

## 2017-08-15 RX ADMIN — ROCURONIUM BROMIDE 5 MG: 10 INJECTION, SOLUTION INTRAVENOUS at 09:06

## 2017-08-15 RX ADMIN — SODIUM CHLORIDE, SODIUM LACTATE, POTASSIUM CHLORIDE, AND CALCIUM CHLORIDE 150 ML/HR: 600; 310; 30; 20 INJECTION, SOLUTION INTRAVENOUS at 12:48

## 2017-08-15 RX ADMIN — MIDAZOLAM HYDROCHLORIDE 2 MG: 1 INJECTION, SOLUTION INTRAMUSCULAR; INTRAVENOUS at 07:26

## 2017-08-15 RX ADMIN — FENTANYL CITRATE 100 MCG: 50 INJECTION, SOLUTION INTRAMUSCULAR; INTRAVENOUS at 07:30

## 2017-08-15 RX ADMIN — Medication 10 MG: at 07:49

## 2017-08-15 RX ADMIN — INSULIN LISPRO 3 UNITS: 100 INJECTION, SOLUTION INTRAVENOUS; SUBCUTANEOUS at 13:30

## 2017-08-15 RX ADMIN — HYDROMORPHONE HYDROCHLORIDE 0.2 MG: 2 INJECTION, SOLUTION INTRAMUSCULAR; INTRAVENOUS; SUBCUTANEOUS at 09:06

## 2017-08-15 RX ADMIN — ACETAMINOPHEN 1000 MG: 10 INJECTION, SOLUTION INTRAVENOUS at 12:49

## 2017-08-15 RX ADMIN — Medication 10 MG: at 07:52

## 2017-08-15 RX ADMIN — HYDROMORPHONE HYDROCHLORIDE 0.5 MG: 2 INJECTION, SOLUTION INTRAMUSCULAR; INTRAVENOUS; SUBCUTANEOUS at 09:24

## 2017-08-15 RX ADMIN — SODIUM CHLORIDE, SODIUM LACTATE, POTASSIUM CHLORIDE, AND CALCIUM CHLORIDE: 600; 310; 30; 20 INJECTION, SOLUTION INTRAVENOUS at 07:57

## 2017-08-15 RX ADMIN — ACETAMINOPHEN 1000 MG: 10 INJECTION, SOLUTION INTRAVENOUS at 07:57

## 2017-08-15 RX ADMIN — CEFAZOLIN SODIUM 2 G: 2 SOLUTION INTRAVENOUS at 20:39

## 2017-08-15 RX ADMIN — HYDROMORPHONE HYDROCHLORIDE 0.5 MG: 2 INJECTION, SOLUTION INTRAMUSCULAR; INTRAVENOUS; SUBCUTANEOUS at 09:43

## 2017-08-15 RX ADMIN — GLYCOPYRROLATE 0.6 MG: 0.2 INJECTION INTRAMUSCULAR; INTRAVENOUS at 09:29

## 2017-08-15 RX ADMIN — CEFAZOLIN SODIUM 2 G: 2 SOLUTION INTRAVENOUS at 07:26

## 2017-08-15 RX ADMIN — ROCURONIUM BROMIDE 50 MG: 10 INJECTION, SOLUTION INTRAVENOUS at 07:34

## 2017-08-15 RX ADMIN — HYDROMORPHONE HYDROCHLORIDE 0.5 MG: 1 INJECTION, SOLUTION INTRAMUSCULAR; INTRAVENOUS; SUBCUTANEOUS at 19:53

## 2017-08-15 RX ADMIN — Medication 10 MG: at 07:59

## 2017-08-15 RX ADMIN — HYDROMORPHONE HYDROCHLORIDE 0.5 MG: 1 INJECTION, SOLUTION INTRAMUSCULAR; INTRAVENOUS; SUBCUTANEOUS at 13:30

## 2017-08-15 RX ADMIN — KETOROLAC TROMETHAMINE 30 MG: 30 INJECTION, SOLUTION INTRAMUSCULAR; INTRAVENOUS at 09:21

## 2017-08-15 RX ADMIN — SODIUM CHLORIDE, SODIUM LACTATE, POTASSIUM CHLORIDE, AND CALCIUM CHLORIDE 125 ML/HR: 600; 310; 30; 20 INJECTION, SOLUTION INTRAVENOUS at 06:31

## 2017-08-15 RX ADMIN — SODIUM CHLORIDE, SODIUM LACTATE, POTASSIUM CHLORIDE, AND CALCIUM CHLORIDE: 600; 310; 30; 20 INJECTION, SOLUTION INTRAVENOUS at 09:16

## 2017-08-15 RX ADMIN — ONDANSETRON 4 MG: 2 INJECTION INTRAMUSCULAR; INTRAVENOUS at 07:26

## 2017-08-15 NOTE — ROUTINE PROCESS
Bedside and Verbal shift change report given to Mahnaz Kyle RN (oncoming nurse) by Veronica Mixon RN   (offgoing nurse). Report included the following information SBAR, Kardex, Procedure Summary, MAR, Recent Results and Med Rec Status.

## 2017-08-15 NOTE — PROGRESS NOTES
conducted a pre-surgery visit with Betty Goodman, who is a 37 y.o.,female. The  provided the following Interventions:  Initiated a relationship of care and support. Offered prayer and assurance of continued prayers on patient's behalf. Plan:  Chaplains will continue to follow and will provide pastoral care on an as needed/requested basis.  recommends bedside caregivers page  on duty if patient shows signs of acute spiritual or emotional distress.     [address] [facility],[address], [address] office  [phone] pager

## 2017-08-15 NOTE — OP NOTES
OPERATIVE REPORT                           Patient:Edie Sloan                 : 1975                Medical Record Number:[id number]                  Pre-operative Diagnosis:  HYPERTENSION,DIABETES,MORBID OBESITY,BMI 41,FATTY LIVER                Post-operative Diagnosis: HYPERTENSION,DIABETES,MORBID OBESITY,BMI 41,FATTY LIVER                Procedure: Procedure(s):  LAPAROSCOPIC GASTRIC BYPASS, WEDGE LIVER BIOPSY, INTRAOPERATIVE ENDOSCOPY WITH BIOPSY                Location: [facility]                Surgeon: Ashlee Coyne MD                [facility]                  Anesthesia: General                 Specimen:  Liver Wedge  Biopsy                EBL: less than 5cc                Additional Findings: none                      STATEMENT OF MEDICAL NECESSITY: The patient is a 37y.o.-year-old female who has had a long-standing history of obesity. She has developed health problems related to  obesity and has significant cardiac disease and came to me in consultation  for the gastric bypass procedure. she has undergone preoperative evaluation and was felt to be a reasonable candidate for surgery. I explained to the patient the risks associated with this procedure and she understood all this very clearly and did wish to proceed with operative intervention at this time period. OPERATIVE PROCEDURE: The patient was brought to the operating room, placed on the table in the supine position at which time period general anesthesia was administered without any difficulty. The abdomen was then prepped and draped in  The usual sterile fashion. Using a 15 blade, a 1 cm incision was made just to the left of the midline at the level of the umbilicus. A Veres needle approach was used to gain access to the peritoneal cavity which was then insufflated.  The Visiport was then placed at that site insufflating the abdomen, then 4 additional trocars were placed in the usual U-shaped configuration with a subxiphoid incision being made to accommodate the [facility] retractor. On entering the abdomen, the patient was noted to have a massively fatty liver with some evidence of nodularity throughout possibly consistent with early steatohepatitis. I began the operation by choosing an area approximately 50 cm distal to the ligament of Treitz. I transected the small bowel using the Endo PAVEL stapler with white reloads, I then divided the mesentery of the small bowel using 3 firings of the Endo PAVEL stapler, which allowed for good mobilization to the upper abdominal region. I then measured off a 150-cm Johny limb bypass and placed  it in a side-to-side fashion with the afferent limb placing stay sutures in the 2 limbs of the bowel. I then created enterotomies in the 2 limbs of the bowel and placed the Endo PAVEL stapler intra luminally closing it and firing  it creating the linear anastomosis. With this anastomosis being hemostatic, the free margin of the enterotomy was then re approximated using 2-0 Ethibond suture and these sutures were then held up to facilitate closure using the stapling device. The mesenteric defect at this site was then closed  using a running 2-0 Ethibond suture. I then elected to bring the johny limb anterior to the transverse colon and did so in the usual fashion. The johny limb reached nicely to the upper abdominal region under no tension. I then placed a Baker's tube transorally in the stomach and inflated the balloon to 20 mL of saline solution and then I pulled it back towards the gastroesophageal junction. I then at this juncture dissected along the angle of His, exposing the left crura and I likewise dissected along the lesser curvature of the stomach, entering the retro gastric space. Once this space was then developed, I then marked the planned anastomosis of the pouch using a single dot of dilute methylene blue. I then removed the Baker's tube at this juncture. An anterior gastrotomy was then fashioned in the antrum of the stomach, then the cap of a 21 ILS stapler was then placed transabdominally guiding it through the gastrotomy out through the anterior gastric pouch in the area marked using a flamingo grasper and a Prolene suture attached to the cap. After retrieving the cap, a purse string suture was then placed at the base and tied securely. I then created the pouch using the powered [place] stapler with blue reloads. I used one firing along the base of the planned pouch then 3 vertical firings completing this linear 20 mL pouch. With the pouch having been created, the anterior gastrotomy was then closed using the same stapling device. I then at this juncture brought the Johny limb in a antecolic, antegastric fashion. It reached nicely to the level of the pouch under no tension at all. I then opened up the free end of the Johny limb using the Harmonic scalpel. I guided the circular stapling device transabdominally through the left lower quadrant incision, guiding it through the enterotomy thendeploying the spear through the antimesenteric border of the bowel. It was then attached to the cap, closed and fired creating the circular anastomosis. With 2 very good tissue rings  noted within the stapling device, the free margin of the Johny limb was then closed using the Endo PAVEL stapler with white reloads. I then over sewed the anastomosis using 2-0 Ethibond suture, then tested the pouch using dilute methylene blue noting it to be completely water tight. I then left the operative field and proceeded to the the head of the bed and performed an intraoperative EGD. The scope was passed successfully into the gastric pouch to the level of the anastomosis. There was no bleeding noted and no leak appreciated with air insufflation. A biopsy for H. Pylori was   performed and submitted to pathology. I then returned to the surgical field.   At this juncture I then straightened out the Johny limb which  passed anterior to the transverse colon. When this was all achieved, I then turned my attention to checking all areas for hemostasis using Eviseal and Sugicel SNOW to achieve this. I then removed the liver retractor and due to its abnormal appearance  I did perform a liver wedge biopsy it to assess for fibrosis and submitted it to pathology for permanent section. I then placed a J-P drain in the upper abdominal region. I removed all trocars and closed the left lower quadrant trocar site using a transabdominal 0 PDS suture along the fascia. All skin incisions were then closed using 4-0 sutures of Monocryl and Steri-Strips and sterile dressings were applied. The patient tolerated the procedure well.                  Vero Wu M.D.

## 2017-08-15 NOTE — ANESTHESIA POSTPROCEDURE EVALUATION
Post-Anesthesia Evaluation and Assessment    Cardiovascular Function/Vital Signs  Visit Vitals    /65    Pulse 87    Temp 36.9 °C (98.4 °F)    Resp 18    Ht 5' 5\" (1.651 m)    Wt 110.8 kg (244 lb 5 oz)    SpO2 100%    BMI 40.66 kg/m2       Patient is status post Procedure(s):  LAPAROSCOPIC GASTRIC BYPASS, WEDGE LIVER BIOPSY, INTRAOPERATIVE ENDOSCOPY WITH BIOPSY. Nausea/Vomiting: Controlled. Postoperative hydration reviewed and adequate. Pain:  Pain Scale 1: Numeric (0 - 10) (08/15/17 1015)  Pain Intensity 1: 3 (08/15/17 1015)   Managed. Neurological Status:   Neuro (WDL): Exceptions to WDL (08/15/17 1015)   At baseline. Mental Status and Level of Consciousness: Baseline and stable. Pulmonary Status:   O2 Device: Nasal cannula (08/15/17 1004)   Adequate oxygenation and airway patent. Complications related to anesthesia: None    Post-anesthesia assessment completed. No concerns. Patient has met all discharge requirements.     Signed By: Jessica Knapp MD

## 2017-08-15 NOTE — PERIOP NOTES
TRANSFER - OUT REPORT:    Verbal report given to Nancy Kelley RN(name) on Marya Kellee  being transferred to medical room 325(unit) for routine progression of care       Report consisted of patients Situation, Background, Assessment and   Recommendations(SBAR). Information from the following report(s) SBAR, OR Summary, Procedure Summary, Intake/Output and MAR was reviewed with the receiving nurse. Lines:   Peripheral IV 08/15/17 Left Arm (Active)   Site Assessment Clean, dry, & intact 8/15/2017 10:15 AM   Phlebitis Assessment 0 8/15/2017 10:15 AM   Infiltration Assessment 0 8/15/2017 10:15 AM   Dressing Status Clean, dry, & intact 8/15/2017 10:15 AM   Dressing Type Transparent;Tape 8/15/2017 10:15 AM   Hub Color/Line Status Green; Infusing;Patent 8/15/2017 10:15 AM        Opportunity for questions and clarification was provided.       Patient transported with:   O2 @ 2 liters  Registered Nurse  [facility]

## 2017-08-15 NOTE — IP AVS SNAPSHOT
[address] [facility] 
 
 
 [address] [place] [address] 
[phone] Patient: Elif Lemus MRN: [id number] VSM:7/9/3997 You are allergic to the following No active allergies Recent Documentation Height Weight BMI OB Status Smoking Status 1.651 m 110.8 kg 40.66 kg/m2 Having regular periods Former Smoker Emergency Contacts Name Discharge Info Relation Home Work Mobile [facility] CAREGIVER [3] Spouse [3]   [phone] About your hospitalization You were admitted on:  August 15, 2017 You last received care in the:  [address] [place] [address] You were discharged on:  August 16, 2017 Unit phone number:  [phone] Why you were hospitalized Your primary diagnosis was:  Not on File Your diagnoses also included: Morbid Obesity With Bmi Of 40.0-44.9, Adult (Hcc) Providers Seen During Your Hospitalizations Provider Role Specialty Primary office phone Ethyl MD Ramin Attending Provider General Surgery [phone] Your Primary Care Physician (PCP) Primary Care Physician Office Phone Office Fax [place] 23 [phone] Follow-up Information Follow up With Details Comments Contact Info Ethyl MD Ramin On 8/29/2017 Follow up appointment scheduled for August 29, 2017 at 1:30 p.m. [address] [facility] 
[phone] Jere Correa MD   [address] [facility] [address] [facility] 
[phone] Your Appointments Tuesday August 29, 2017  1:30 PM EDT  
POST OP with Jonh Franklin NP [facility] [place] ([facility])  
 [address] [facility]  
[phone] Tuesday September 12, 2017  4:00 PM EDT Office Visit with Jonh Franklin NP  
 [facility] ([address] [place] [address])  
 [address] [place] [address] [address] [facility]  
[phone] Tuesday September 12, 2017  4:30 PM EDT Office Visit with TSS NUTRI VISIT PEN [facility] ([address] [place] [address])  
 [address] [place] [address] [address] [facility]  
[phone] Current Discharge Medication List  
  
CONTINUE these medications which have NOT CHANGED Dose & Instructions Dispensing Information Comments Morning Noon Evening Bedtime  
 atorvastatin 20 mg tablet Commonly known as:  LIPITOR Your last dose was: Your next dose is:    
   
   
 Dose:  20 mg Take 20 mg by mouth nightly. Refills:  0  
     
   
   
   
  
 labetalol 100 mg tablet Commonly known as:  Brittani [place] Your last dose was: Your next dose is:    
   
   
 Dose:  100 mg Take 100 mg by mouth two (2) times a day. Indications: hypertension Refills:  0  
     
   
   
   
  
 valsartan-hydroCHLOROthiazide 160-12.5 mg per tablet Commonly known as:  DIOVAN-HCT Your last dose was: Your next dose is:    
   
   
 Dose:  1 Tab Take 1 Tab by mouth nightly. Refills:  0 WELLBUTRIN 75 mg tablet Generic drug:  buPROPion Your last dose was: Your next dose is: Take  by mouth two (2) times a day. Refills:  0 STOP taking these medications FISH OIL PO Liraglutide 0.6 mg/0.1 mL (18 mg/3 mL) sub-q pen Commonly known as:  VICTOZA  
   
  
 metFORMIN 1,000 mg tablet Commonly known as:  GLUCOPHAGE  
   
  
 multivitamin tablet Commonly known as:  ONE A DAY Discharge Instructions [facility] Wilman Martinez M.D., F.A.C.S. 
[address] [place] [address]. [address] Office: [phone]    Fax:  [phone] Discharge Instruction for Gastric Bypass / Sleeve Gastrectomy Patients Diet: 
? Continue with the liquid diet until you are seen in the office. Make sure you sip fluids all day. Aim for  Gms of protein every day. Activity: 
? Walking is encouraged. Rest when you are tired. ? You may shower. ? You may climb stairs. Take your time. ? No lifting more than 10-15 lbs. ? If you feel discomfort during an activity, rest. 
? Do not drive for 1 week. Wound Care: ? Clean incisions with soap and water when in the shower. Pat dry. ? Leave steri-strips on until they fall off. ? A small amount of drainage may be present from the incisions. Contact the office if you notice an increase in drainage, an odor, increased redness, or fever > 100.5. Medications: 
? You will receive a prescription for pain medication at the time of discharge. ? For upset stomach you may take over the counter medications such as Maalox, Mylanta, Pepcid, Zantac, or Tagamet. ? You may take Tylenol 
? Non-aspirin based arthritis medications may be resumed after the first month. ? Take a childrens or adult chewable multivitamin. ? Milk of Magnesia will help with constipation. ? It is fine to take your usual home medications. Blood pressure medications should be continued after surgery. Diabetic medications can frequently be reduced very quickly after surgery. Diabetics should continue to monitor blood sugars frequently after surgery and contact the prescribing physician for any questions. Follow-Up Appointment: 
? If you do not already have a follow-up appointment scheduled, contact the office in the next few days to obtain one. It is usually scheduled for 10-14 days after you surgery date. Office phone number:  [phone]. REV  09/2010 Discharge Orders None [facility] Announcement  We are excited to announce that we are making your provider's discharge notes available to you in CloudCover. You will see these notes when they are completed and signed by the physician that discharged you from your recent hospital stay. If you have any questions or concerns about any information you see in CloudCover, please call the Health Information Department where you were seen or reach out to your Primary Care Provider for more information about your plan of care. Introducing \[facility]\"" & HEALTH SERVICES! Meredith Richards introduces CloudCover patient portal. Now you can access parts of your medical record, email your doctor's office, and request medication refills online. 1. In your internet browser, go to [email or web address] 2. Click on the First Time User? Click Here link in the Sign In box. You will see the New Member Sign Up page. 3. Enter your CloudCover Access Code exactly as it appears below. You will not need to use this code after youve completed the sign-up process. If you do not sign up before the expiration date, you must request a new code. · CloudCover Access Code: [email or web address] Expires: 10/3/2017  9:36 AM 
 
4. Enter the last four digits of your Social Security Number (xxxx) and Date of Birth (mm/dd/yyyy) as indicated and click Submit. You will be taken to the next sign-up page. 5. Create a CloudCover ID. This will be your CloudCover login ID and cannot be changed, so think of one that is secure and easy to remember. 6. Create a CloudCover password. You can change your password at any time. 7. Enter your Password Reset Question and Answer. This can be used at a later time if you forget your password. 8. Enter your e-mail address. You will receive e-mail notification when new information is available in [address] 9. Click Sign Up. You can now view and download portions of your medical record. 10. Click the Download Summary menu link to download a portable copy of your medical information. If you have questions, please visit the Frequently Asked Questions section of the MyChart website. Remember, MyChart is NOT to be used for urgent needs. For medical emergencies, dial 911. Now available from your iPhone and Android! General Information Please provide this summary of care documentation to your next provider. Patient Signature:  ____________________________________________________________ Date:  ____________________________________________________________  
  
Claudene Born Provider Signature:  ____________________________________________________________ Date:  ____________________________________________________________

## 2017-08-15 NOTE — PERIOP NOTES
Verbal hand off at the bedside with receiving RN provided opportunity for questions. Vital signs stable dressing clean and dry and intact, family made aware of room assignment.

## 2017-08-15 NOTE — ROUTINE PROCESS
TRANSFER - IN REPORT:    Verbal report received from Sweetie Ybarra RN(name) on Jena Singh  being received from [facility]) for routine post - op      Report consisted of patients Situation, Background, Assessment and   Recommendations(SBAR). Information from the following report(s) SBAR, Kardex, Procedure Summary, Intake/Output, MAR and Recent Results was reviewed with the receiving nurse. Opportunity for questions and clarification was provided. Assessment completed upon patients arrival to unit and care assumed.

## 2017-08-15 NOTE — IP AVS SNAPSHOT
[address] [place] [address] 
[phone] Patient: Rosario Macedo MRN: [id number] KJ:[id number] Current Discharge Medication List  
  
CONTINUE these medications which have NOT CHANGED Dose & Instructions Dispensing Information Comments Morning Noon Evening Bedtime  
 atorvastatin 20 mg tablet Commonly known as:  LIPITOR Your last dose was: Your next dose is:    
   
   
 Dose:  20 mg Take 20 mg by mouth nightly. Refills:  0  
     
   
   
   
  
 labetalol 100 mg tablet Commonly known as:  Naomie Jones Your last dose was: Your next dose is:    
   
   
 Dose:  100 mg Take 100 mg by mouth two (2) times a day. Indications: hypertension Refills:  0  
     
   
   
   
  
 valsartan-hydroCHLOROthiazide 160-12.5 mg per tablet Commonly known as:  DIOVAN-HCT Your last dose was: Your next dose is:    
   
   
 Dose:  1 Tab Take 1 Tab by mouth nightly. Refills:  0 WELLBUTRIN 75 mg tablet Generic drug:  buPROPion Your last dose was: Your next dose is: Take  by mouth two (2) times a day. Refills:  0 STOP taking these medications FISH OIL PO Liraglutide 0.6 mg/0.1 mL (18 mg/3 mL) sub-q pen Commonly known as:  VICTOZA  
   
  
 metFORMIN 1,000 mg tablet Commonly known as:  GLUCOPHAGE  
   
  
 multivitamin tablet Commonly known as:  ONE A DAY

## 2017-08-15 NOTE — PROGRESS NOTES
Pt admitted for an elective surgical procedure. Pt is independent and her spouse will assist upon discharge. Please encourage ambulation. No plan of care needs identified. Anticipate pt will be medically stable for discharge within the next 24-48 hours. CM available to assist as needed. Discharge Reassessment Plan:  Low Risk    RRAT Score:  1 - 12     Low Risk Care Transition Interventions:  1. Discharge transition plan:  Physician follow up   2. Involved patient/caregiver in assessment, planning, education and implement of intervention. 3. CM daily patient care huddles/interdisciplinary rounds were completed. 4. PCP/Specialist appointment within 5 days made prior to discharge. Date/Time  5. Facilitated transportation and logistics for follow-up appointments. 6. Handoff to [facility] Nurse Navigator or PCP practice. Care Management Interventions  PCP Verified by CM: Yes  Mode of Transport at Discharge:  Other (see comment) (Spouse)  Transition of Care Consult (CM Consult): Discharge Planning  Health Maintenance Reviewed: Yes  Current Support Network: Lives with Spouse  Confirm Follow Up Transport: Self  Discharge Location  Discharge Placement: Home

## 2017-08-15 NOTE — H&P
Gastric Bypass - History and Physical     Subjective:      The patient is a 37 y.o. obese female with a Body mass index is 41.1 kg/(m^2). .   she presents now to review their   work up to date to see if they are a candidate for surgery and whether or not to proceed with the previously requested procedure. Bariatric comorbidities continue to include:        Patient Active Problem List   Diagnosis Code    [facility]bid obesity ([facility] E66.01    Morbid obesity with body mass index of 40.0-49.9 ([facility] E66.01    Diabetes mellitus ([facility] E11.9    Hypertension I10    Hypercholesterolemia E78.00    Smoking F17.200    Depression F32.9    Sleep apnea G47.30    Irregular menses N92.6    PCOS (polycystic ovarian syndrome) E28.2    Psoriasis L40.9       They have been generally well prior to this visit and have had no recent significant illnesses. The patient has had no gastrointestinal issues   that would preclude them from proceeding with the surgery they have chosen. Miguel Angel Casejulián has recently tried a preoperative weight loss   program  in addition to seeing a bariatric nutritionist preoperatively. We have discussed on at least one other occasion about the various   types of surgical weight loss procedures and they have considered these options after our initial consultation. We have once again discussed   these procedures in detail and they have now decided on a surgical procedure. They present today to discuss this and confirm that their   evaluation pre operatively is acceptable to continue with surgery. The patient desires laparoscopic gastric bypass surgery for surgical weight loss. The patients goal weight is 140lb. These goals are consistent with expected outcomes of their desired operation.   her Medical goals are resolution of these health issues.          Patient Active Problem List     Diagnosis Date Noted    Psoriasis      Morbid obesity ([facility]      Morbid obesity with body mass index of 40.0-49.9 ([facility]      Diabetes mellitus (HCC)      Hypertension      Hypercholesterolemia      Smoking      Depression      Sleep apnea      Irregular menses      PCOS (polycystic ovarian syndrome)              Past Surgical History:   Procedure Laterality Date    HX  SECTION         x2    HX KNEE ARTHROSCOPY Right      HX TONSILLECTOMY        HX TUBAL LIGATION                Social History   Substance Use Topics    Smoking status: Former Smoker       Years: 0.10       Types: Cigarettes       Quit date: 2017    Smokeless tobacco: Never Used    Alcohol use No            Family History   Problem Relation Age of Onset    Hypertension Father      Arthritis-osteo Father               Current Outpatient Prescriptions   Medication Sig Dispense Refill    oxyCODONE-acetaminophen (PERCOCET) 5-325 mg per tablet Take 1 Tab by mouth every four (4) hours as needed for Pain. Max Daily Amount: 6 Tabs. 30 Tab 0    Omeprazole delayed release (PRILOSEC D/R) 20 mg tablet Take 1 Tab by mouth daily. OTC 30 Tab 1    Liraglutide (VICTOZA) 0.6 mg/0.1 mL (18 mg/3 mL) sub-q pen 1.8 mg by SubCUTAneous route daily.        valsartan-hydroCHLOROthiazide (DIOVAN-HCT) 160-12.5 mg per tablet Take 1 Tab by mouth nightly.        metFORMIN (GLUCOPHAGE) 1,000 mg tablet Take 1,000 mg by mouth two (2) times daily (with meals).        atorvastatin (LIPITOR) 20 mg tablet Take 20 mg by mouth nightly.        multivitamin (ONE A DAY) tablet Take 1 Tab by mouth daily.        labetalol (NORMODYNE) 100 mg tablet Take 100 mg by mouth two (2) times a day. Indications: hypertension        varenicline (CHANTIX) 1 mg tablet Take 1 mg by mouth two (2) times daily (after meals).         DOCOSAHEXANOIC ACID/EPA (FISH OIL PO) Take 2 Caps by mouth nightly.          No Known Allergies         Review of Systems:            General - No history or complaints of unexpected fever, chills, or weight loss  Head/Neck - No history or complaints of headache, diplopia, dysphagia, hearing loss  Cardiac - No history or complaints of chest pain, palpitations, murmur, or shortness of breath  Pulmonary - No history or complaints of shortness of breath, productive cough, hemoptysis  Gastrointestinal - minimal reflux,no  abdominal pain, obstipation/constipation or blood per rectum  Genitourinary - No history or complaints of hematuria/dysuria, stress urinary incontinence symptoms, or renal lithiasis  Musculoskeletal - mild joint pain in their knees,  no muscular weakness  Hematologic - No history or complaints of bleeding disorders,  No blood transfusions  Neurologic - No history or complaints of  migraine headaches, seizure activity, syncopal episodes, TIA or stroke  Integumentary - No history or complaints of rashes, abnormal nevi, skin cancer  Gynecological - sporadic menses                  Objective:           Visit Vitals    /76 (BP 1 Location: Left arm, BP Patient Position: Sitting)    Pulse 98    Resp 16    Ht 5' 5\" (1.651 m)    Wt 112 kg (247 lb)    LMP 06/03/2017 (Approximate)    SpO2 100%    BMI 41.1 kg/m2         Physical Examination: General appearance - alert, well appearing, and in no distress and oriented to person, place, and time  Mental status - alert, oriented to person, place, and time, normal mood, behavior, speech, dress, motor activity, and thought processes  Eyes - pupils equal and reactive, extraocular eye movements intact, sclera anicteric, left eye normal, right eye normal  Ears - right ear normal, left ear normal  Nose - normal and patent, no erythema, discharge or polyps  Mouth - mucous membranes moist, pharynx normal without lesions  Neck - supple, no significant adenopathy  Lymphatics - no palpable lymphadenopathy, no hepatosplenomegaly  Chest - clear to auscultation, no wheezes, rales or rhonchi, symmetric air entry  Heart - normal rate, regular rhythm, normal S1, S2, no murmurs, rubs, clicks or gallops  Abdomen - soft, nontender, nondistended, no masses or organomegaly  Back exam - full range of motion, no tenderness, palpable spasm or pain on motion  Neurological - alert, oriented, normal speech, no focal findings or movement disorder noted  Musculoskeletal - no joint tenderness, deformity or swelling  Extremities - peripheral pulses normal, no pedal edema, no clubbing or cyanosis  Skin - normal coloration and turgor, no rashes, no suspicious skin lesions noted     Labs :            Lab Results   Component Value Date/Time     WBC 11.8 2017 11:00 AM     HGB 13.5 2017 11:00 AM     HCT 39.7 2017 11:00 AM     PLATELET 760 6688 11:00 AM     MCV 89.6 2017 11:00 AM            Lab Results   Component Value Date/Time     Sodium 137 2017 11:00 AM     Potassium 4.9 2017 11:00 AM     Chloride 99 2017 11:00 AM     CO2 27 2017 11:00 AM     Anion gap 11 2017 11:00 AM     Glucose 122 2017 11:00 AM     BUN 10 2017 11:00 AM     Creatinine 0.72 2017 11:00 AM     BUN/Creatinine ratio 14 2017 11:00 AM     GFR est AA >60 2017 11:00 AM     GFR est non-AA >60 2017 11:00 AM     Calcium 9.2 2017 11:00 AM     Bilirubin, total 0.3 2017 11:00 AM     AST (SGOT) 22 2017 11:00 AM     Alk.  phosphatase 37 2017 11:00 AM     Protein, total 7.4 2017 11:00 AM     Albumin 3.6 2017 11:00 AM     Globulin 3.8 2017 11:00 AM     A-G Ratio 0.9 2017 11:00 AM     ALT (SGPT) 35 2017 11:00 AM      No results found for: IRON, FE, TIBC, IBCT, PSAT, FERR  No results found for: FOL, RBCF  No results found for: VITD3, XQVID2, XQVID3, XQVID, VD3RIA                            Cardiac / Pulmonary Evaluation:             UGI Results:      Patient:Edie Davison                    : 1975  Medical Record Number:[id number]                      PREPROCEDURE DIAGNOSIS: This patient is preoperative for laparoscopic gastric bypass surgeryprocedure with a history of  reflux disease.      POSTPROCEDURE DIAGNOSIS: This patient is preoperative for laparoscopic gastric bypass surgeryprocedure with a history of  reflux disease.          PROCEDURES PERFORMED: Upper GI study with barium.      ESTIMATED BLOOD LOSS: None.      [address] [facility] NECESSITY: The patient is a patient with a  longstanding history of obesity. They are now considering the laparoscopic gastric bypass surgeryprocedure as a means of surgical weight control and due to their history of reflux disease and are being assessed preoperatively for such.      DESCRIPTION OF PROCEDURE: The patient was brought to the fluoroscopy unit and  was given thin barium. On swallowing of barium, they were noted to have  normal peristalsis of their esophagus. They had prompt filling of distal  esophagus with tapering into the gastroesophageal junction. There was no evidence of a hiatal hernia present. Contrast then filled the gastric cardia, fundus,body and pre pyloric region with no abnormalities noted. Contrast then exited the pylorus in normal fashion. No obstruction was noted. There was no evidence of reflux noted.      (normal anatomy)      Fang Boothe MD     Assessment:      Morbid obesity with associated comorbidity     Plan:      laparoscopic gastric bypass surgery     This is a 37 y.o. female with a BMI of Body mass index is 41.1 kg/(m^2). and the weight-related co-morbidties as noted above. Yayo Pickens meets the NIH criteria for bariatric surgery based upon the BMI of Body mass index is 41.1 kg/(m^2). and multiple weight-related co-morbidties.  Yayo Pickens has elected laparoscopic gastric bypass as her intervention of choice for treatment of morbid obestiy through surgical means secondary to its uniform results,  profound baseline suppression of hunger and pace at which weight is lost.     In the office today, following Edie's history and physical examination, a 40 minute discussion regarding the anatomic alterations for the laparoscopic gastric bypass  was undertaken. The dietary expectations and the patient  dependent factors for success were thoroughly discussed, to include the need for interval follow-up and long-term dietary changes associated with success. The possible short and long term  complications of the gastric bypass were also discussed, to include but not limited to;death, DVT/PE, staple line leak, bleeding, stricture formation, infection,internal hernia  and pouch dilation. Specific weight related outcomes for success were also discussed with an emphasis on careful and close follow-up with the first year and Dietary behavior modification over the first years as baseline cyclical hunger returns  The patient expressed an understanding of the above factors, and her questions were answered in their entirety.     In addition, the patient attended a 1.5 hour power point seminar regarding obesity, surgical weight loss including, adjustable gastric band, gastric bypass, and sleeve gastrectomy. This discussion contrasted the different surgical techniques, mechanisms of actions and expected outcomes, and surgical and medical risks associated with each procedure. During this seminar, there was a long question and answer session where each questions was answered until there were no additional questions.      Today, the patient had all of her questions answered and the decision was made today that the patient's preoperative evaluation is acceptable for them  to proceed with bariatric surgery  choosing  gastric bypass as her surgical option.            Secondary Diagnoses:      Adult Onset Diabetes - The patient has perla given a very low carbohydrate diet preoperatively along with instructions to monitor their blood sugars on a regular daily basis.  When  their surgery is performed  we will be monitoring the patient with sliding scale insulin and accuchecks.  Based on those values we will determine whether the patient needs a reduction of those medications postoperatively or total removal of those medications on discharge.  We will have the patient continue accuchecks postoperatively while at home also and report to me or their family physician for appropriate adjustments as needed.  The patient also understands that in the event of uncontrolled blood sugar preoperatively that we may choose to postpone their surgery.     Hypertension - The patient has a clear understanding of how weight loss improves hypertension as a whole, but also they understand that there is a significant genetic component to this disease process. We will monitor the patients blood pressure while in the hospital and the plan would be to continue those medications postoperatively.  If a diuretic is being used we will stop them on discharge to prevent dehydration particularly with the sleeve gastrectomy and the gastric bypass procedures.  They will be instructed to monitor their blood pressure postoperatively while at home and notify their primary care physician in the event of any significantly high or uncharacteristic readings.     Hyperlipidemia - The patient understands that studies show that almost all patient will realize an improvement in their lipid profile with weight loss that occurs with these procedures. They however also understand that hyperlipidemia is a multifactorial disease particularly as it pertains to their genetic background and that there is no guarantee toward cure  of this issue.  We will resume their medications immediately postoperatively as this tends to decrease any post operative cardiac events.  The patient will follow up with their family physician in the postoperative period with plans to repeat their lipid panel 2-3 month postoperative for potential adjustment or removal of these medications.     Abnormal EKG - The patient has undergone or will undergo a preoperative evaluation by a cardiologist such that they are deemed a reasonable candidate for surgery. The patient understands that with a history of cardiac disease that there is always an increased risk compared to the average patient. Appropriate recommendations have been followed as recommended by the cardiologist.  The patients ASA will be resumed approximately 1 month postoperatively in a coated form. She will undergo a stress test on Thursday.     Smoking Cessation - Today I have counseled the patient extensively regarding smoking cessation for greater than 10 minutes. They have been counseled extensively about the detrimental effects of smoking on their weight loss surgical procedure particularly for the gastric bypass and sleeve gastrectomy procedures. They understand that smoking leads to pulmonary issues postoperatively and can lead to gastric ulcers and marginal ulcers in the post bariatric surgery pouch that has been created. They understand that they must stop smoking 1 month at least prior to surgery or it may affect their ultimate progression to their procedure. They understand finally that labs may be obtained to prove that they have ceased smoking prior to surgery. Total time counseling was greater than 10 minutes.     Obstructive Sleep Apnea -The patient understands the association of sleep apnea and obesity and the additional risk that it caries related to post surgical complications. If they have not been tested for sleep apnea and I feel they are at increased risk for this diagnosis, then they will be scheduled for a consultation with a Pulmonologist for such.  In the event that they lavon this diagnosis we will have the patient bring their CPAP machine to the hospital for use both postoperatively in the PACU and on the floor at its appropriate setting.  We will have them continue using it while at home after surgery and follow up with their pulmonologist 6 months after to be retested to see if it can be discontinued at that time period.           Signed By: Ashlee Coyne MD      August 15, 2017

## 2017-08-15 NOTE — NURSE NAVIGATOR
Doing well. Denies nausea. Pain = 5. Tolerating ice chips. Dressings dry and intact. Adequate urinary output. KAVITHA output WNL. Encouraged to cough, deep breathe, and use spirometer every hour while awake. Encouraged to be out of bed ambulating this evening. Discussed diet and activity progression tomorrow after UGI.

## 2017-08-15 NOTE — ANESTHESIA PREPROCEDURE EVALUATION
Anesthetic History   No history of anesthetic complications            Review of Systems / Medical History  Patient summary reviewed, nursing notes reviewed and pertinent labs reviewed    Pulmonary        Sleep apnea: CPAP           Neuro/Psych   Within defined limits           Cardiovascular    Hypertension              Exercise tolerance: >4 METS     GI/Hepatic/Renal  Within defined limits              Endo/Other    Diabetes    Morbid obesity     Other Findings              Physical Exam    Airway  Mallampati: III  TM Distance: 4 - 6 cm  Neck ROM: normal range of motion   Mouth opening: Normal     Cardiovascular  Regular rate and rhythm,  S1 and S2 normal,  no murmur, click, rub, or gallop  Rhythm: regular  Rate: normal         Dental  No notable dental hx       Pulmonary  Breath sounds clear to auscultation               Abdominal  GI exam deferred       Other Findings            Anesthetic Plan    ASA: 3  Anesthesia type: general          Induction: Intravenous  Anesthetic plan and risks discussed with: Patient and Spouse

## 2017-08-15 NOTE — PERIOP NOTES
TRANSFER - IN REPORT:    Verbal report received from KELY Gaitan RN and Valeria CRNA(name) on [employer]  being received from OR(unit) for routine post - op      Report consisted of patients Situation, Background, Assessment and   Recommendations(SBAR). Information from the following report(s) SBAR, OR Summary, Procedure Summary, Intake/Output and MAR was reviewed with the receiving nurse. Opportunity for questions and clarification was provided. Assessment completed upon patients arrival to unit and care assumed.

## 2017-08-16 ENCOUNTER — APPOINTMENT (OUTPATIENT)
Dept: GENERAL RADIOLOGY | Age: 42
DRG: 621 | End: 2017-08-16
Attending: SPECIALIST
Payer: COMMERCIAL

## 2017-08-16 VITALS
RESPIRATION RATE: 16 BRPM | HEART RATE: 105 BPM | OXYGEN SATURATION: 94 % | TEMPERATURE: 99.5 F | BODY MASS INDEX: 40.71 KG/M2 | WEIGHT: 244.31 LBS | DIASTOLIC BLOOD PRESSURE: 80 MMHG | HEIGHT: 65 IN | SYSTOLIC BLOOD PRESSURE: 138 MMHG

## 2017-08-16 LAB
GLUCOSE BLD STRIP.AUTO-MCNC: 146 MG/DL (ref 70–110)
GLUCOSE BLD STRIP.AUTO-MCNC: 153 MG/DL (ref 70–110)

## 2017-08-16 PROCEDURE — 82962 GLUCOSE BLOOD TEST: CPT

## 2017-08-16 PROCEDURE — 74011250636 HC RX REV CODE- 250/636: Performed by: SPECIALIST

## 2017-08-16 PROCEDURE — 74011636320 HC RX REV CODE- 636/320: Performed by: SPECIALIST

## 2017-08-16 PROCEDURE — 74011250637 HC RX REV CODE- 250/637: Performed by: SPECIALIST

## 2017-08-16 PROCEDURE — 74240 X-RAY XM UPR GI TRC 1CNTRST: CPT

## 2017-08-16 RX ORDER — OXYCODONE AND ACETAMINOPHEN 5; 325 MG/1; MG/1
1 TABLET ORAL
Status: DISCONTINUED | OUTPATIENT
Start: 2017-08-16 | End: 2017-08-16 | Stop reason: HOSPADM

## 2017-08-16 RX ADMIN — SODIUM CHLORIDE, SODIUM LACTATE, POTASSIUM CHLORIDE, AND CALCIUM CHLORIDE 150 ML/HR: 600; 310; 30; 20 INJECTION, SOLUTION INTRAVENOUS at 12:05

## 2017-08-16 RX ADMIN — HYDROMORPHONE HYDROCHLORIDE 0.5 MG: 1 INJECTION, SOLUTION INTRAMUSCULAR; INTRAVENOUS; SUBCUTANEOUS at 01:36

## 2017-08-16 RX ADMIN — IOHEXOL 50 ML: 240 INJECTION, SOLUTION INTRATHECAL; INTRAVASCULAR; INTRAVENOUS; ORAL at 08:10

## 2017-08-16 RX ADMIN — KETOROLAC TROMETHAMINE 30 MG: 30 INJECTION, SOLUTION INTRAMUSCULAR at 05:59

## 2017-08-16 RX ADMIN — OXYCODONE HYDROCHLORIDE AND ACETAMINOPHEN 1 TABLET: 5; 325 TABLET ORAL at 09:13

## 2017-08-16 RX ADMIN — IOHEXOL 40 ML: 240 INJECTION, SOLUTION INTRATHECAL; INTRAVASCULAR; INTRAVENOUS; ORAL at 08:12

## 2017-08-16 RX ADMIN — KETOROLAC TROMETHAMINE 30 MG: 30 INJECTION, SOLUTION INTRAMUSCULAR at 12:10

## 2017-08-16 RX ADMIN — ENOXAPARIN SODIUM 40 MG: 40 INJECTION SUBCUTANEOUS at 05:59

## 2017-08-16 RX ADMIN — KETOROLAC TROMETHAMINE 30 MG: 30 INJECTION, SOLUTION INTRAMUSCULAR at 01:27

## 2017-08-16 RX ADMIN — OXYCODONE HYDROCHLORIDE AND ACETAMINOPHEN 1 TABLET: 5; 325 TABLET ORAL at 12:51

## 2017-08-16 NOTE — PROGRESS NOTES
Bariatric Surgery                POD #1 (note reflects encounter Dr. Phyllis Lara had with patient)    Visit Vitals    /80    Pulse (!) 105    Temp (!) 100.6 °F (38.1 °C)    Resp 16    Ht 5' 5\" (1.651 m)    Wt 110.8 kg (244 lb 5 oz)    LMP 08/12/2017 (Exact Date)    SpO2 94%    BMI 40.66 kg/m2     Patient has minimal complaints of pain, minimal nausea noted     Exam:  Appears well in no distress  Lungs- clear bilaterally  Abd - soft, incisions look good without erythema  Extremities- no new edema or swelling    UGI - no obstrustion or leak    Data Review:    Labs: Results:       Chemistry No results for input(s): GLU, NA, K, CL, CO2, BUN, CREA, CA, AGAP, BUCR, TBIL, GPT, AP, TP, ALB, GLOB, AGRAT in the last 72 hours. CBC w/Diff No results for input(s): WBC, RBC, HGB, HCT, PLT, GRANS, LYMPH, EOS, RETIC, HGBEXT, HCTEXT, PLTEXT in the last 72 hours. Coagulation No results for input(s): PTP, INR, APTT in the last 72 hours. No lab exists for component: INREXT    Liver Enzymes No results for input(s): TP, ALB, TBIL, AP, SGOT, GPT in the last 72 hours. No lab exists for component: DBIL       Assessment/Plan: S/P  laparoscopic gastric bypass surgery - doing well without any issues    1. D/C home

## 2017-08-16 NOTE — DISCHARGE INSTRUCTIONS
[facility]  Chino Duong. Leanna Melendez M.D., .A.C.S.  [address] [place] [address]. [address], [facility] [facility]  Office: [phone]    Fax:  [phone]    Discharge Instruction for Gastric Bypass / Sleeve Gastrectomy Patients    Diet:   Continue with the liquid diet until you are seen in the office. Make sure you sip fluids all day. Aim for  Gms of protein every day. Activity:   Walking is encouraged. Rest when you are tired.  You may shower.  You may climb stairs. Take your time.  No lifting more than 10-15 lbs.  If you feel discomfort during an activity, rest.   Do not drive for 1 week. Wound Care:   Clean incisions with soap and water when in the shower. Pat dry.  Leave steri-strips on until they fall off.  A small amount of drainage may be present from the incisions. Contact the office if you notice an increase in drainage, an odor, increased redness, or fever > 100.5. Medications:   You will receive a prescription for pain medication at the time of discharge.  For upset stomach you may take over the counter medications such as Maalox, Mylanta, Pepcid, Zantac, or Tagamet.  You may take Tylenol   Non-aspirin based arthritis medications may be resumed after the first month.  Take a childrens or adult chewable multivitamin.  Milk of Magnesia will help with constipation.  It is fine to take your usual home medications. Blood pressure medications should be continued after surgery. Diabetic medications can frequently be reduced very quickly after surgery. Diabetics should continue to monitor blood sugars frequently after surgery and contact the prescribing physician for any questions. Follow-Up Appointment:   If you do not already have a follow-up appointment scheduled, contact the office in the next few days to obtain one. It is usually scheduled for 10-14 days after you surgery date. Office phone number:  [phone].         REV  09/2010

## 2017-08-16 NOTE — PROGRESS NOTES
Patient stated she has no home CPAP machine, currently breathing without any difficulty, will continue to monitor.

## 2017-08-16 NOTE — ROUTINE PROCESS
Dual AVS reviewed with Saman Rothman RN. All medications reviewed individually with patient. Opportunities for questions and concerns provided. Patient discharged via (mode of transport ie. Car, ambulance or air transport) car  Patient's arm band appropriately discarded.

## 2017-08-16 NOTE — PROGRESS NOTES
Shift Summary :  No signs of distress. Ambulated during the shift. Using IS, TEDs and SCDs. Surgical sites dry and intact with KAVITHA drain with minimal amount of serosanguinous drainage. Burping but not passing gas from rectum. Domínguez discontinued at 0607 and awaiting first voiding.

## 2017-08-16 NOTE — PROGRESS NOTES
[address] given at this time. Computer is down, work order placed with IT. Will document once computer and scanner are available.

## 2017-08-16 NOTE — NURSE NAVIGATOR
Doing well. UGI results confirmed. Tolerating liquid diet and protein shakes. Denies nausea. Pain - 3.  KAVITHA output WNL. Adequate urine output. Encouraged to continue to cough, deep breathe and use spirometer every hour while awake. Encouraged to be out of bed ambulating no less than 3 times today. Dressings removed. Incisions dry and intact. Discussed diet and activities after discharge.

## 2017-08-16 NOTE — ROUTINE PROCESS
Bedside and Verbal shift change report given to MAGNUS Stone RN  (oncoming nurse) by  Peggy HERNANDEZ RN (offgoing nurse). Report included the following information SBAR, Kardex, OR Summary, Recent Results and Med Rec Status.

## 2017-08-16 NOTE — PROGRESS NOTES
SHIFT SUMMARY: No events during this short shift. Dilaudid 0.5 mg given for pain in upper abdomen rated 4/10. Zofran given once for nausea. KAVITHA drain draining serosanguinous fluid. Domínguez catheter in place and draining yellow urine.

## 2017-08-16 NOTE — DISCHARGE SUMMARY
Discharge Summary    Patient: Marya Goodson               Sex: female          DOA: 8/15/2017         YOB: 1975      Age:  37 y.o.        LOS:  LOS: 1 day                Admit Date: 8/15/2017    Discharge Date: 8/16/2017    Admission Diagnoses: HYPERTENSION,DIABETES,MORBID OBESITY,BMI 39  Morbid obesity with BMI of 40.0-44.9, [facility])    Discharge Diagnoses:    Problem List as of 8/16/2017  Date Reviewed: 7/5/2017          Codes Class Noted - Resolved    Morbid obesity with BMI of 40.0-44.9, [facility]) ICD-10-CM: E66.01, Z68.41  ICD-9-CM: 278.01, V85.41  8/15/2017 - Present        Psoriasis ICD-10-CM: L40.9  ICD-9-CM: 696.1  Unknown - Present        Morbid obesity ([facility] ICD-10-CM: E66.01  ICD-9-CM: 278.01  Unknown - Present        Morbid obesity with body mass index of 40.0-49.9 ([facility].) ICD-10-CM: E66.01  ICD-9-CM: 278.01  Unknown - Present        Diabetes mellitus ([facility].) ICD-10-CM: E11.9  ICD-9-CM: 250.00  Unknown - Present    Overview Signed 3/17/2017 10:00 AM by TALISHA Foley     Dx 2004             Hypertension ICD-10-CM: I10  ICD-9-CM: 401.9  Unknown - Present        Hypercholesterolemia ICD-10-CM: E78.00  ICD-9-CM: 272.0  Unknown - Present        Smoking ICD-10-CM: F17.200  ICD-9-CM: 305.1  Unknown - Present        Depression ICD-10-CM: F32.9  ICD-9-CM: 554  Unknown - Present        Sleep apnea ICD-10-CM: G47.30  ICD-9-CM: 780.57  Unknown - Present    Overview Signed 3/17/2017 10:01 AM by TALISHA Foley     uses c-pap             Irregular menses ICD-10-CM: N92.6  ICD-9-CM: 626.4  Unknown - Present        PCOS (polycystic ovarian syndrome) ICD-10-CM: E28.2  ICD-9-CM: 256.4  Unknown - Present              Discharge Condition: Good    Hospital Course: The patient underwent  laparoscopic gastric bypass surgery  on 8/15/2017. The patient tolerated the procedure well. Vital signs remained stable and the patient was transferred to  3rd floor surgical unit without complications.  The patient remained stable throughout the first night post operatively with stable vital signs and adequate urine output and pain control. Pain was controlled with Dilaudid IV and IV Tylenol . The patient on the first morning post operative was transferred to the radiology suite where they underwent a gastrograffin UGI study which showed no evidence of a leak or stricture. The drain was discontinued on POD # 1 and the patient was started on a bariatric liquid diet with protein shakes. The patient progressed throughout the day and was ambulating well and tolerating their diet. They were therefore discharged home with instructions to notify me with any issues that may arise. Significant Diagnostic Studies:   No results for input(s): HGB, HGBEXT in the last 72 hours. No results for input(s): HCT, HCTEXT in the last 72 hours. Current Discharge Medication List      CONTINUE these medications which have NOT CHANGED    Details   buPROPion (WELLBUTRIN) 75 mg tablet Take  by mouth two (2) times a day. valsartan-hydroCHLOROthiazide (DIOVAN-HCT) 160-12.5 mg per tablet Take 1 Tab by mouth nightly. atorvastatin (LIPITOR) 20 mg tablet Take 20 mg by mouth nightly. labetalol (NORMODYNE) 100 mg tablet Take 100 mg by mouth two (2) times a day. Indications: hypertension         STOP taking these medications       Liraglutide (VICTOZA) 0.6 mg/0.1 mL (18 mg/3 mL) sub-q pen Comments:   Reason for Stopping:         metFORMIN (GLUCOPHAGE) 1,000 mg tablet Comments:   Reason for Stopping:         multivitamin (ONE A DAY) tablet Comments:   Reason for Stopping:         DOCOSAHEXANOIC ACID/EPA (FISH OIL PO) Comments:   Reason for Stopping:               Activity: activity as tolerated with no heavy lifting of greater than 20 pounds. No anti- inflammatory medications. Use stool softeners at home as needed while taking pain medications since they are constipating.     Diet: Bariatric liquid diet    Wound Care: Keep wound clean and dry, Reinforce dressing PRN and ice to area for comfort. Do not get wound wet for 2 days.     Follow-up: 14 days with Dr Cristofer Nuñez M.D

## 2017-08-16 NOTE — PROGRESS NOTES
1557- Notified by CNA that pt temperature was 100.6. Pt states that she feels fine, and only has a headache. She is not due for a Percocet at this time, so I instructed her to use the incentive spirometer and I would recheck her temperature. 1630- Reassessed pt at this time, her temperature is now 99.5. Pt states that she feels fine and is ready to go home.

## 2017-08-16 NOTE — ROUTINE PROCESS
Bedside and Verbal shift change report given to HERMILA Appiah RN (oncoming nurse) by Shorty Garcia RN  (offgoing nurse). Report included the following information SBAR, Kardex, Intake/Output and MAR.

## 2017-08-29 ENCOUNTER — OFFICE VISIT (OUTPATIENT)
Dept: SURGERY | Age: 42
End: 2017-08-29

## 2017-08-29 VITALS
HEART RATE: 92 BPM | SYSTOLIC BLOOD PRESSURE: 125 MMHG | WEIGHT: 234.8 LBS | DIASTOLIC BLOOD PRESSURE: 72 MMHG | OXYGEN SATURATION: 100 % | BODY MASS INDEX: 39.12 KG/M2 | HEIGHT: 65 IN

## 2017-08-29 DIAGNOSIS — Z98.84 S/P GASTRIC BYPASS: ICD-10-CM

## 2017-08-29 DIAGNOSIS — K74.00 HEPATIC FIBROSIS: ICD-10-CM

## 2017-08-29 DIAGNOSIS — K90.9 INTESTINAL MALABSORPTION, UNSPECIFIED TYPE: Primary | ICD-10-CM

## 2017-08-29 NOTE — PATIENT INSTRUCTIONS
Body Mass Index: Care Instructions  Your Care Instructions    Body mass index (BMI) can help you see if your weight is raising your risk for health problems. It uses a formula to compare how much you weigh with how tall you are. · A BMI lower than 18.5 is considered underweight. · A BMI between 18.5 and 24.9 is considered healthy. · A BMI between 25 and 29.9 is considered overweight. A BMI of 30 or higher is considered obese. If your BMI is in the normal range, it means that you have a lower risk for weight-related health problems. If your BMI is in the overweight or obese range, you may be at increased risk for weight-related health problems, such as high blood pressure, heart disease, stroke, arthritis or joint pain, and diabetes. If your BMI is in the underweight range, you may be at increased risk for health problems such as fatigue, lower protection (immunity) against illness, muscle loss, bone loss, hair loss, and hormone problems. BMI is just one measure of your risk for weight-related health problems. You may be at higher risk for health problems if you are not active, you eat an unhealthy diet, or you drink too much alcohol or use tobacco products. Follow-up care is a key part of your treatment and safety. Be sure to make and go to all appointments, and call your doctor if you are having problems. It's also a good idea to know your test results and keep a list of the medicines you take. How can you care for yourself at home? · Practice healthy eating habits. This includes eating plenty of fruits, vegetables, whole grains, lean protein, and low-fat dairy. · If your doctor recommends it, get more exercise. Walking is a good choice. Bit by bit, increase the amount you walk every day. Try for at least 30 minutes on most days of the week. · Do not smoke. Smoking can increase your risk for health problems. If you need help quitting, talk to your doctor about stop-smoking programs and medicines. These can increase your chances of quitting for good. · Limit alcohol to 2 drinks a day for men and 1 drink a day for women. Too much alcohol can cause health problems. If you have a BMI higher than 25  · Your doctor may do other tests to check your risk for weight-related health problems. This may include measuring the distance around your waist. A waist measurement of more than 40 inches in men or 35 inches in women can increase the risk of weight-related health problems. · Talk with your doctor about steps you can take to stay healthy or improve your health. You may need to make lifestyle changes to lose weight and stay healthy, such as changing your diet and getting regular exercise. If you have a BMI lower than 18.5  · Your doctor may do other tests to check your risk for health problems. · Talk with your doctor about steps you can take to stay healthy or improve your health. You may need to make lifestyle changes to gain or maintain weight and stay healthy, such as getting more healthy foods in your diet and doing exercises to build muscle. Where can you learn more? Go to http://mariano-ashlee.info/. Enter S176 in the search box to learn more about \"Body Mass Index: Care Instructions. \"  Current as of: January 23, 2017  Content Version: 11.3  © 0062-7073 [employer]. Care instructions adapted under license by [employer] (which disclaims liability or warranty for this information). If you have questions about a medical condition or this instruction, always ask your healthcare professional. [place] [address] any warranty or liability for your use of this information. Walking for Exercise: Care Instructions  Your Care Instructions    Walking is one of the easiest ways to get the exercise you need for good health. A brisk, 30-minute walk each day can help you feel better and have more energy. It can help you lower your risk of disease.  Walking can help you keep your bones strong and your heart healthy. Check with your doctor before you start a walking plan if you have heart problems, other health issues, or you have not been active in a long time. Follow your doctor's instructions for safe levels of exercise. Follow-up care is a key part of your treatment and safety. Be sure to make and go to all appointments, and call your doctor if you are having problems. It's also a good idea to know your test results and keep a list of the medicines you take. How can you care for yourself at home? Getting started  · Start slowly and set a short-term goal. For example, walk for 5 or 10 minutes every day. · Bit by bit, increase the amount you walk every day. Try for at least 30 minutes on most days of the week. You also may want to swim, bike, or do other activities. · If finding enough time is a problem, it is fine to be active in blocks of 10 minutes or more throughout your day and week. · To get the heart-healthy benefits of walking, you need to walk briskly enough to increase your heart rate and breathing, but not so fast that you cannot talk comfortably. · Wear comfortable shoes that fit well and provide good support for your feet and ankles. Staying with your plan  · After you've made walking a habit, set a longer-term goal. You may want to set a goal of walking briskly for longer or walking farther. Experts say to do 2½ hours of moderate activity a week. A faster heartbeat is what defines moderate-level activity. · To stay motivated, walk with friends, coworkers, or pets. · Use a phone libertad or pedometer to track your steps each day. Set a goal to increase your steps. Once you get there, set a higher goal. Aim for 10,000 steps a day. · If the weather keeps you from walking outside, go for walks at the mall with a friend. Local schools and churches may have indoor gyms where you can walk.   Fitting a walk into your workday  · Park several blocks away from work, or get off the bus a few stops early. · Use the stairs instead of the elevator, at least for a few floors. · Suggest holding meetings with colleagues during a walk inside or outside the building. · Use the restroom that is the farthest from your desk or workstation. · Use your morning and afternoon breaks to take quick 15-minute walks. Staying safe  · Know your surroundings. Walk in a well-lighted, safe place. If it is dark, walk with a partner. Wear light-colored clothing. If you can, buy a vest or jacket that reflects light. · Carry a cell phone for emergencies. · Drink plenty of water. Take a water bottle with you when you walk. This is very important if it is hot out. · Be careful not to slip on wet or icy ground. You can buy \"grippers\" for your shoes to help keep you from slipping. · Pay attention to your walking surface. Use sidewalks and paths. · If you have breathing problems like asthma or COPD, ask your doctor when it is safe for you to walk outdoors. Cold, dry air, smog, pollen, or other things in the air could cause breathing problems. Where can you learn more? Go to http://mariano-ashlee.info/. Enter R159 in the search box to learn more about \"Walking for Exercise: Care Instructions. \"  Current as of: March 13, 2017  Content Version: 11.3  © 2539-9750 [employer]. Care instructions adapted under license by [employer] (which disclaims liability or warranty for this information). If you have questions about a medical condition or this instruction, always ask your healthcare professional. [place] [address] any warranty or liability for your use of this information.

## 2017-08-29 NOTE — MR AVS SNAPSHOT
Visit Information Date & Time Provider Department Dept. Phone Encounter #  
 8/29/2017  1:30 PM [facility] Ryanne Tijerina [phone] [id number] Follow-up Instructions Follow-up and Disposition History Your Appointments 9/12/2017  4:00 PM  
Office Visit with [facility] Ryanne Tijerina ([facility]) Appt Note: 1 mo po  
 [address] [facility] [address]  
[phone]  
  
   
 [address] [place] [address]  
  
    
 9/12/2017  4:30 PM  
Office Visit with [facility] N[facility]I [facility]ISIT PEN [facility] Ryanne Tijerina ([facility]) Appt Note: 1 mo po  
 [address] [facility] [address]  
  
   
 [address] [place] [address] Upcoming Health Maintenance Date Due  
 LIPID PANEL Q1 1976 FOOT EXAM Q1 9/9/1986 MICROALBUMIN Q1 9/9/1986 EYE EXAM RETINAL OR DILATED Q1 9/9/1986 Pneumococcal 19-64 Medium Risk (1 of 1 - PPSV23) 9/9/1995 DTaP/Tdap/Td series (1 - Tdap) 9/9/1997 PAP AKA CERVICAL CYTOLOGY 9/9/1997 INFLUENZA AGE 9 TO ADULT 8/1/2017 HEMOGLOBIN A1C Q6M 2/7/2018 Allergies as of 8/29/2017  Review Complete On: 8/29/2017 By: Trent Perez NP No Known Allergies Current Immunizations  Never Reviewed No immunizations on file. Not reviewed this visit You Were Diagnosed With   
  
 Codes Comments Intestinal malabsorption, unspecified type    -  Primary ICD-10-CM: K90.9 ICD-9-CM: 579.9 S/P gastric bypass     ICD-10-CM: R27.89 ICD-9-CM: V45.86 Hepatic fibrosis ([facility] [address]     ICD-10-CM: K74.0 ICD-9-CM: 571.5 Vitals  BP Pulse Height(growth percentile) Weight(growth percentile) LMP SpO2  
 125/72 (BP 1 Location: Left arm, BP Patient Position: Sitting) 92 5' 5\" (1.651 m) 234 lb 12.8 oz (106.5 kg) 08/15/2017 (Approximate) 100% BMI OB Status Smoking Status 39.07 kg/m2 Having regular periods Former Smoker BMI and BSA Data Body Mass Index Body Surface Area 39.07 kg/m 2 2.21 m 2 Preferred Pharmacy Pharmacy Name Phone [id number] [address] [place] [address], [facility] [address] [phone] Your Updated Medication List  
  
   
This list is accurate as of: 8/29/17  2:20 PM.  Always use your most recent med list.  
  
  
  
  
 atorvastatin 20 mg tablet Commonly known as:  LIPITOR Take 20 mg by mouth nightly. labetalol 100 mg tablet Commonly known as:  Shaunna Glenn Take 100 mg by mouth two (2) times a day. Indications: hypertension  
  
 multivitamin with iron chewable tablet Commonly known as:  Haven Comp Take 1 Tab by mouth daily. valsartan-hydroCHLOROthiazide 160-12.5 mg per tablet Commonly known as:  DIOVAN-HCT Take 1 Tab by mouth nightly. WELLBUTRIN 75 mg tablet Generic drug:  buPROPion Take  by mouth two (2) times a day. We Performed the Following REFERRAL TO LIVER HEPATOLOGY [QAQ328 Custom] Referral Information Referral ID Referred By Referred To  
  
 [id number] [address]  Rosie Rizvi, [address] [place] [address] Phone: [phone] Fax: [phone] Visits Status Start Date End Date 1 New Request 8/29/17 8/29/18 If your referral has a status of pending review or denied, additional information will be sent to support the outcome of this decision. Patient Instructions Body Mass Index: Care Instructions Your Care Instructions Body mass index (BMI) can help you see if your weight is raising your risk for health problems. It uses a formula to compare how much you weigh with how tall you are. · A BMI lower than 18.5 is considered underweight. · A BMI between 18.5 and 24.9 is considered healthy. · A BMI between 25 and 29.9 is considered overweight. A BMI of 30 or higher is considered obese. If your BMI is in the normal range, it means that you have a lower risk for weight-related health problems. If your BMI is in the overweight or obese range, you may be at increased risk for weight-related health problems, such as high blood pressure, heart disease, stroke, arthritis or joint pain, and diabetes. If your BMI is in the underweight range, you may be at increased risk for health problems such as fatigue, lower protection (immunity) against illness, muscle loss, bone loss, hair loss, and hormone problems. BMI is just one measure of your risk for weight-related health problems. You may be at higher risk for health problems if you are not active, you eat an unhealthy diet, or you drink too much alcohol or use tobacco products. Follow-up care is a key part of your treatment and safety. Be sure to make and go to all appointments, and call your doctor if you are having problems. It's also a good idea to know your test results and keep a list of the medicines you take. How can you care for yourself at home? · Practice healthy eating habits. This includes eating plenty of fruits, vegetables, whole grains, lean protein, and low-fat dairy. · If your doctor recommends it, get more exercise. Walking is a good choice. Bit by bit, increase the amount you walk every day. Try for at least 30 minutes on most days of the week. · Do not smoke. Smoking can increase your risk for health problems. If you need help quitting, talk to your doctor about stop-smoking programs and medicines. These can increase your chances of quitting for good. · Limit alcohol to 2 drinks a day for men and 1 drink a day for women. Too much alcohol can cause health problems. If you have a BMI higher than 25 · Your doctor may do other tests to check your risk for weight-related health problems. This may include measuring the distance around your waist. A waist measurement of more than 40 inches in men or 35 inches in women can increase the risk of weight-related health problems. · Talk with your doctor about steps you can take to stay healthy or improve your health. You may need to make lifestyle changes to lose weight and stay healthy, such as changing your diet and getting regular exercise. If you have a BMI lower than 18.5 · Your doctor may do other tests to check your risk for health problems. · Talk with your doctor about steps you can take to stay healthy or improve your health. You may need to make lifestyle changes to gain or maintain weight and stay healthy, such as getting more healthy foods in your diet and doing exercises to build muscle. Where can you learn more? Go to http://mariano-ashlee.info/. Enter S176 in the search box to learn more about \"Body Mass Index: Care Instructions. \" Current as of: January 23, 2017 Content Version: 11.3 © 7418-2670 [employer]. Care instructions adapted under license by [employer] (which disclaims liability or warranty for this information). If you have questions about a medical condition or this instruction, always ask your healthcare professional. [address] any warranty or liability for your use of this information. Walking for Exercise: Care Instructions Your Care Instructions Walking is one of the easiest ways to get the exercise you need for good health. A brisk, 30-minute walk each day can help you feel better and have more energy. It can help you lower your risk of disease. Walking can help you keep your bones strong and your heart healthy. Check with your doctor before you start a walking plan if you have heart problems, other health issues, or you have not been active in a long time. Follow your doctor's instructions for safe levels of exercise. Follow-up care is a key part of your treatment and safety. Be sure to make and go to all appointments, and call your doctor if you are having problems. It's also a good idea to know your test results and keep a list of the medicines you take. How can you care for yourself at home? Getting started · Start slowly and set a short-term goal. For example, walk for 5 or 10 minutes every day. · Bit by bit, increase the amount you walk every day. Try for at least 30 minutes on most days of the week. You also may want to swim, bike, or do other activities. · If finding enough time is a problem, it is fine to be active in blocks of 10 minutes or more throughout your day and week. · To get the heart-healthy benefits of walking, you need to walk briskly enough to increase your heart rate and breathing, but not so fast that you cannot talk comfortably. · Wear comfortable shoes that fit well and provide good support for your feet and ankles. Staying with your plan · After you've made walking a habit, set a longer-term goal. You may want to set a goal of walking briskly for longer or walking farther. Experts say to do 2½ hours of moderate activity a week. A faster heartbeat is what defines moderate-level activity. · To stay motivated, walk with friends, coworkers, or pets. · Use a phone libertad or pedometer to track your steps each day. Set a goal to increase your steps. Once you get there, set a higher goal. Aim for 10,000 steps a day. · If the weather keeps you from walking outside, go for walks at the mall with a friend. Local schools and churches may have indoor gyms where you can walk. Fitting a walk into your workday · Park several blocks away from work, or get off the bus a few stops early. · Use the stairs instead of the elevator, at least for a few floors. · Suggest holding meetings with colleagues during a walk inside or outside the building. · Use the restroom that is the farthest from your desk or workstation. · Use your morning and afternoon breaks to take quick 15-minute walks. Staying safe · Know your surroundings. Walk in a well-lighted, safe place. If it is dark, walk with a partner. Wear light-colored clothing. If you can, buy a vest or jacket that reflects light. · Carry a cell phone for emergencies. · Drink plenty of water. Take a water bottle with you when you walk. This is very important if it is hot out. · Be careful not to slip on wet or icy ground. You can buy \"grippers\" for your shoes to help keep you from slipping. · Pay attention to your walking surface. Use sidewalks and paths. · If you have breathing problems like asthma or COPD, ask your doctor when it is safe for you to walk outdoors. Cold, dry air, smog, pollen, or other things in the air could cause breathing problems. Where can you learn more? Go to http://mariano-ashlee.info/. Enter R159 in the search box to learn more about \"Walking for Exercise: Care Instructions. \" Current as of: March 13, 2017 Content Version: 11.3 © 2407-8751 [employer]. Care instructions adapted under license by [employer] (which disclaims liability or warranty for this information). If you have questions about a medical condition or this instruction, always ask your healthcare professional. [place] [address] any warranty or liability for your use of this information. Patient Instructions History Introducing [facility]! Bao Schmidt introduces [employer] patient portal. Now you can access parts of your medical record, email your doctor's office, and request medication refills online. 1. In your internet browser, go to [email or web address] 2. Click on the First Time User? Click Here link in the Sign In box. You will see the New Member Sign Up page. 3. Enter your [employer] Access Code exactly as it appears below. You will not need to use this code after youve completed the sign-up process. If you do not sign up before the expiration date, you must request a new code. · [employer] Access Code: [email or web address] Expires: 10/3/2017  9:36 AM 
 
4. Enter the last four digits of your Social Security Number (xxxx) and Date of Birth (mm/dd/yyyy) as indicated and click Submit. You will be taken to the next sign-up page. 5. Create a [employer] ID. This will be your [employer] login ID and cannot be changed, so think of one that is secure and easy to remember. 6. Create a [employer] password. You can change your password at any time. 7. Enter your Password Reset Question and Answer. This can be used at a later time if you forget your password. 8. Enter your e-mail address. You will receive e-mail notification when new information is available in [address]. 9. Click Sign Up. You can now view and download portions of your medical record. 10. Click the Download Summary menu link to download a portable copy of your medical information. If you have questions, please visit the Frequently Asked Questions section of the [employer] website. Remember, [employer] is NOT to be used for urgent needs. For medical emergencies, dial 911. Now available from your iPhone and Android! Please provide this summary of care documentation to your next provider. Your primary care clinician is listed as Geraldo Marlow. If you have any questions after today's visit, please call [phone].

## 2017-08-29 NOTE — PROGRESS NOTES
Subjective:     Rosario Macedo  is a 37 y.o. female who presents for follow-up about 2 week following laparoscopic gastric bypass surgery . She has lost a total of 13 pounds since surgery. Body mass index is 39.07 kg/(m^2). States blood sugar readings 'higher' but not checking a fasting reading, and not sure of exact number. Still not smoking. Surgery related complication: NA.   Liver bx report reviewed with pt - showed fibrosis/BENOIT. Stomach bx report reviewed with pt. She is tolerating liquids without difficulty, and denies vomiting and abdominal pain. Fluid intake:good  Protein intake:good  Taking prescribed multivitamin. The patient's activity level: moderately active. Changes in her medical history and medications have been reviewed. Patient Active Problem List   Diagnosis Code    Morbid obesity ([facility] [address] [address] E66.01    Morbid obesity with body mass index of 40.0-49.9 ([facility] E66.01    Diabetes mellitus ([facility] [address] E11.9    Hypertension I10    Hypercholesterolemia E78.00    Smoking F17.200    Depression F32.9    Sleep apnea G47.30    Irregular menses N92.6    PCOS (polycystic ovarian syndrome) E28.2    Psoriasis L40.9    Morbid obesity with BMI of 40.0-44.9, adult (HCC) E66.01, Z68.41     Past Medical History:   Diagnosis Date    Depression     Diabetes mellitus ([address]     Dx  / HgbA1c 7.2017    Hypercholesterolemia     Hypertension     Irregular menses     Morbid obesity ([facility] [address]     Morbid obesity with body mass index of 40.0-49.9 (HCC)     PCOS (polycystic ovarian syndrome)     Psoriasis     Sleep apnea     no CPAP    Smoking     on chantix as of 2017     Past Surgical History:   Procedure Laterality Date    HX  SECTION      x2    HX KNEE ARTHROSCOPY Right     HX TONSILLECTOMY      HX TUBAL LIGATION       Current Outpatient Prescriptions   Medication Sig Dispense Refill    multivitamin with iron (FLINTSTONES) chewable tablet Take 1 Tab by mouth daily.  buPROPion (WELLBUTRIN) 75 mg tablet Take  by mouth two (2) times a day.  valsartan-hydroCHLOROthiazide (DIOVAN-HCT) 160-12.5 mg per tablet Take 1 Tab by mouth nightly.  atorvastatin (LIPITOR) 20 mg tablet Take 20 mg by mouth nightly.  labetalol (NORMODYNE) 100 mg tablet Take 100 mg by mouth two (2) times a day. Indications: hypertension         Objective:     Visit Vitals    /72 (BP 1 Location: Left arm, BP Patient Position: Sitting)    Pulse 92    Ht 5' 5\" (1.651 m)    Wt 106.5 kg (234 lb 12.8 oz)    SpO2 100%    BMI 39.07 kg/m2        Physical Exam:    General:  alert, cooperative, no distress, appears stated age   Heart:  Regular rate and rhythm. .                     Lungs:   Lungs CTA   Abdomen:   abdomen is soft without significant tenderness, masses, organomegaly or guarding; Active bowel sounds all 4 quadrants. No hernia present. Incisions: healing well       Assessment:     1. History of Morbid obesity, status post  laparoscopic gastric bypass surgery. Doing well. 2. Hypertension. Monitor BP readings and symptoms. F/u with PCP. 3. Diabetes. Medications stopped. Monitor fasting readings twice a week. F/u with PCP. 4. Hepatic fibrosis - Referral to hepatology. Plan:     1. To continue focus on hydration. 2. May advance diet to soft phase. Reminded to measure portions, continue high protein, low carbohydrate diet. Reminded to eat regularly, to eat slowly & not to drink with meals. Discussed with pt. Pt verbalized understanding. 3.   Reminded about importance of taking vitamin supplements. 4.  To start cardio exercise. 5.  To continue current rx. To continue follow-up with PCP. 6.  Encouraged attendance @ support group  7. I have discussed this plan and education material with patient. Pt verbalized understanding. 8.   To follow-up in 2 week(s). To call if questions/concerns prior to office visit.   9.   Smoking cessation efforts reinforced and encouraged. Ms. Janey Zimmerman has a reminder for a \"due or due soon\" health maintenance. I have asked that she contact her primary care provider for follow-up on this health maintenance.

## 2017-09-12 ENCOUNTER — OFFICE VISIT (OUTPATIENT)
Dept: SURGERY | Age: 42
End: 2017-09-12

## 2017-09-12 VITALS
BODY MASS INDEX: 37.9 KG/M2 | DIASTOLIC BLOOD PRESSURE: 69 MMHG | HEART RATE: 83 BPM | OXYGEN SATURATION: 100 % | SYSTOLIC BLOOD PRESSURE: 131 MMHG | HEIGHT: 65 IN | WEIGHT: 227.5 LBS

## 2017-09-12 DIAGNOSIS — E11.9 TYPE 2 DIABETES MELLITUS WITHOUT COMPLICATION, WITHOUT LONG-TERM CURRENT USE OF INSULIN (HCC): ICD-10-CM

## 2017-09-12 DIAGNOSIS — K74.00 HEPATIC FIBROSIS: ICD-10-CM

## 2017-09-12 DIAGNOSIS — Z87.891 HISTORY OF SMOKING: ICD-10-CM

## 2017-09-12 DIAGNOSIS — Z98.84 S/P GASTRIC BYPASS: ICD-10-CM

## 2017-09-12 DIAGNOSIS — I10 ESSENTIAL HYPERTENSION: ICD-10-CM

## 2017-09-12 DIAGNOSIS — E66.01 MORBID OBESITY WITH BMI OF 40.0-44.9, ADULT (HCC): Primary | ICD-10-CM

## 2017-09-12 DIAGNOSIS — K90.9 INTESTINAL MALABSORPTION, UNSPECIFIED TYPE: Primary | ICD-10-CM

## 2017-09-12 RX ORDER — URSODIOL 500 MG/1
500 TABLET, FILM COATED ORAL DAILY
Qty: 30 TAB | Refills: 4 | Status: SHIPPED | OUTPATIENT
Start: 2017-09-12 | End: 2017-09-14 | Stop reason: ALTCHOICE

## 2017-09-12 NOTE — LETTER
NOTIFICATION RETURN TO WORK  
 
9/12/2017 4:31 PM 
 
Ms. Ivan Nolasco [address] [address] To Whom It May Concern: 
 
Ivan Nolasco had surgery on 8/15/17, was seen in our office today and remains under the care of [address]. She will return to work on 9/15/17. Please contact this office if you have any questions or concerns. Sincerely, Declan Lara NP

## 2017-09-12 NOTE — PATIENT INSTRUCTIONS
Your appt with [facility] Specialist is 12/13/17 @ 9180  To start nurys forte for gall bladder protection. May advance diet to solid phase. Remember to measure portions, to keep the focus on increasing your protein & keeping your carbs low. Continue the use of protein shakes. To follow recommendations of dietician. Stay hydrated! Eat regularly, eat slowly & do not drink with your meals. Vitamins to be taken include your multivitamin, B12, calcium citrate, Vit D & Bcomplex. Refer to your notebook & handouts for dosages. Continue to increase cardio activity. May add resistance exercises. Continue follow-up with your PCP. Return to this office in 1 month. Call if questions/concerns prior to your office visit. Walking for Exercise: Care Instructions  Your Care Instructions    Walking is one of the easiest ways to get the exercise you need for good health. A brisk, 30-minute walk each day can help you feel better and have more energy. It can help you lower your risk of disease. Walking can help you keep your bones strong and your heart healthy. Check with your doctor before you start a walking plan if you have heart problems, other health issues, or you have not been active in a long time. Follow your doctor's instructions for safe levels of exercise. Follow-up care is a key part of your treatment and safety. Be sure to make and go to all appointments, and call your doctor if you are having problems. It's also a good idea to know your test results and keep a list of the medicines you take. How can you care for yourself at home? Getting started  · Start slowly and set a short-term goal. For example, walk for 5 or 10 minutes every day. · Bit by bit, increase the amount you walk every day. Try for at least 30 minutes on most days of the week. You also may want to swim, bike, or do other activities.   · If finding enough time is a problem, it is fine to be active in blocks of 10 minutes or more throughout your day and week. · To get the heart-healthy benefits of walking, you need to walk briskly enough to increase your heart rate and breathing, but not so fast that you cannot talk comfortably. · Wear comfortable shoes that fit well and provide good support for your feet and ankles. Staying with your plan  · After you've made walking a habit, set a longer-term goal. You may want to set a goal of walking briskly for longer or walking farther. Experts say to do 2½ hours of moderate activity a week. A faster heartbeat is what defines moderate-level activity. · To stay motivated, walk with friends, coworkers, or pets. · Use a phone libertad or pedometer to track your steps each day. Set a goal to increase your steps. Once you get there, set a higher goal. Aim for 10,000 steps a day. · If the weather keeps you from walking outside, go for walks at the mall with a friend. Local schools and churches may have indoor gyms where you can walk. Fitting a walk into your workday  · Park several blocks away from work, or get off the bus a few stops early. · Use the stairs instead of the elevator, at least for a few floors. · Suggest holding meetings with colleagues during a walk inside or outside the building. · Use the restroom that is the farthest from your desk or workstation. · Use your morning and afternoon breaks to take quick 15-minute walks. Staying safe  · Know your surroundings. Walk in a well-lighted, safe place. If it is dark, walk with a partner. Wear light-colored clothing. If you can, buy a vest or jacket that reflects light. · Carry a cell phone for emergencies. · Drink plenty of water. Take a water bottle with you when you walk. This is very important if it is hot out. · Be careful not to slip on wet or icy ground. You can buy \"grippers\" for your shoes to help keep you from slipping. · Pay attention to your walking surface. Use sidewalks and paths.   · If you have breathing problems like asthma or COPD, ask your doctor when it is safe for you to walk outdoors. Cold, dry air, smog, pollen, or other things in the air could cause breathing problems. Where can you learn more? Go to http://mariano-ashlee.info/. Enter R159 in the search box to learn more about \"Walking for Exercise: Care Instructions. \"  Current as of: March 13, 2017  Content Version: 11.3  © 3777-0464 [employer]. Care instructions adapted under license by [employer] (which disclaims liability or warranty for this information). If you have questions about a medical condition or this instruction, always ask your healthcare professional. [place] [address] any warranty or liability for your use of this information.

## 2017-09-12 NOTE — PROGRESS NOTES
Subjective:     Marya Goodson  is a 40 y.o. female who presents for follow-up about 1 month following laparoscopic gastric bypass surgery. She has lost a total of 19 pounds since surgery. Body mass index is 37.86 kg/(m^2). Has lost 17% of EBW. Surgery related complication: NA, but liver bx showed fibrosis/BENOIT. Has been referred to [address] [address] and appt has been scheduled. She is tolerating soft foods without difficulty and denies vomiting and abdominal pain. Fluid intake: good  Protein intake: good - food + protein shakes  Taking recommended multivitamins. Pt has appt with dietician after this office visit. The patient's exercise level: moderately active. Returns to work Friday. Changes in her medical history and medications have been reviewed:  Remains off her DM rx -  this morning. Upcoming appt with endocrinologist.  Severo Bounds on her normodyne, diovan hct. Denies s/s hypotension.   Remains a non-smoker    Patient Active Problem List   Diagnosis Code    Morbid obesity ([address] E66.01    Morbid obesity with body mass index of 40.0-49.9 ([address] E66.01    Diabetes mellitus ([address] E11.9    Hypertension I10    Hypercholesterolemia E78.00    Smoking F17.200    Depression F32.9    Sleep apnea G47.30    Irregular menses N92.6    PCOS (polycystic ovarian syndrome) E28.2    Psoriasis L40.9    Morbid obesity with BMI of 40.0-44.9, adult (HCC) E66.01, Z68.41    Intestinal malabsorption K90.9    S/P gastric bypass Z98.84    Hepatic fibrosis (HCC) K74.0     Past Medical History:   Diagnosis Date    Depression     Diabetes mellitus ([address]     Dx 2005 / HgbA1c 7.1 Jan 2017    Hypercholesterolemia     Hypertension 2011    Irregular menses     Morbid obesity ([address]     Morbid obesity with body mass index of 40.0-49.9 (HCC)     PCOS (polycystic ovarian syndrome)     Psoriasis     Sleep apnea     no CPAP    Smoking     on chantix as of 2/2017     Past Surgical History: Procedure Laterality Date    HX  SECTION      x2    HX KNEE ARTHROSCOPY Right     HX TONSILLECTOMY      HX TUBAL LIGATION       Current Outpatient Prescriptions   Medication Sig Dispense Refill    ursodiol (SARITHA FORTE) 500 mg tablet Take 1 Tab by mouth daily. Take with food. Indications: CHOLELITHIASIS PREVENTION 30 Tab 4    multivitamin with iron (FLINTSTONES) chewable tablet Take 1 Tab by mouth daily.  buPROPion (WELLBUTRIN) 75 mg tablet Take  by mouth two (2) times a day.  valsartan-hydroCHLOROthiazide (DIOVAN-HCT) 160-12.5 mg per tablet Take 1 Tab by mouth nightly.  atorvastatin (LIPITOR) 20 mg tablet Take 20 mg by mouth nightly.  labetalol (NORMODYNE) 100 mg tablet Take 100 mg by mouth two (2) times a day. Indications: hypertension         Objective:     Visit Vitals    /69 (BP 1 Location: Left arm, BP Patient Position: Sitting)    Pulse 83    Ht 5' 5\" (1.651 m)    Wt 103.2 kg (227 lb 8 oz)    LMP 08/15/2017 (Approximate)    SpO2 100%    BMI 37.86 kg/m2        Physical Exam:    General:  alert, cooperative, no distress, appears stated age   Heart:  Regular rate and rhythm. Lungs CTA. Respiratory effort appropriate. Abdomen:   abdomen is soft without significant tenderness, masses, organomegaly or guarding; Active bowel sounds all 4 quadrants. No hernia present. Incisions: healing well       Assessment:     1. History of Morbid obesity, status post  laparoscopic gastric bypass surgery. Doing well. 2.  DM - off rx  3. HBP - on rx  4. Liver fibrosis - referred to [facility]  5. History of smoking    Plan:     1. To start saritha forte for gall bladder protection. 2. To continue focus on hydration. 3. May advance diet to solid phase. Reminded to measure portions, continue high protein, low carbohydrate diet. Reminded to eat regularly, to eat slowly & not to drink with meals. Diet reviewed with pt & handouts given.           Pt verbalized understanding. 4. Reminded of importance of vitamin supplements. 5. To continue cardio exercise - adding resistance exercises. Discussed with pt.  6. To continue current rx. Reviewed s/s hypotension. To follow-up with PCP about DM & HBP rx. Pt verbalized understanding. 7. Encouraged attendance @ support group  8. I have discussed this plan and education material with patient. Pt verbalized understanding. 9. To follow-up in 1 month(s). To call if questions/concerns prior to office visit. Ms. Lina Chavarria has a reminder for a \"due or due soon\" health maintenance. I have asked that she contact her primary care provider for follow-up on this health maintenance.

## 2017-09-12 NOTE — PROGRESS NOTES
Pt given one on one diet education. Appears to have a good understanding of the diet progression, food choices, and dietary/exercise habits for successful weight loss and nourishment one month after surgery. The  material included: post-op diet progression and portion sizes (including low fat, low sugar food recommendations and emphasis on protein foods and protein supplements), good beverage choices, reading a food label, vitamins/minerals required after weight loss surgery, and encouraging dietary and exercise habits that lead to weight loss success. Pt also received a restaurant card, which tells restaurants that the patient had a procedure that decreases the size of their stomach so the restaurant may let them order off the children's menu, the senior's menu, or a smaller portion for a reduced rate.      Mally Barba RD

## 2017-09-12 NOTE — MR AVS SNAPSHOT
Visit Information Date & Time Provider Department Dept. Phone Encounter #  
 9/12/2017  4:00 PM Suzi Cardoza NP [facility] [id number] Follow-up Instructions Return in about 4 weeks (around 10/10/2017). Follow-up and Disposition History Your Appointments 10/11/2017  8:30 AM  
EST PATIENT PROBLEM with Yenni Groves MD  
[address] [facility]) Appt Note: f/u per Ailyn Barker [address] [place] [address] [address] [facility] [address]  
[phone]  
  
   
 [address] [place] [address]  
  
    
 12/13/2017  9:30 AM  
New Patient with Liliya Phillips MD  
[address] [facility] ([facility]) Appt Note: np, est pcp, referred by Dr. Alicia Yeager [address], [address] [facility] 322 [facility]  
  
   
 [address], [address] [place] [address] Upcoming Health Maintenance Date Due  
 LIPID PANEL Q1 1976 FOOT EXAM Q1 9/9/1986 MICROALBUMIN Q1 9/9/1986 EYE EXAM RETINAL OR DILATED Q1 9/9/1986 Pneumococcal 19-64 Medium Risk (1 of 1 - PPSV23) 9/9/1995 DTaP/Tdap/Td series (1 - Tdap) 9/9/1997 PAP AKA CERVICAL CYTOLOGY 9/9/1997 INFLUENZA AGE 9 TO ADULT 8/1/2017 HEMOGLOBIN A1C Q6M 2/7/2018 Allergies as of 9/12/2017  Review Complete On: 9/12/2017 By: Suzi Cardoza NP No Known Allergies Current Immunizations  Never Reviewed No immunizations on file. Not reviewed this visit You Were Diagnosed With   
  
 Codes Comments Intestinal malabsorption, unspecified type    -  Primary ICD-10-CM: K90.9 ICD-9-CM: 579.9 S/P gastric bypass     ICD-10-CM: I95.50 ICD-9-CM: V45.86 Hepatic fibrosis ([facility]     ICD-10-CM: K74.0 ICD-9-CM: 571.5 Type 2 diabetes mellitus without complication, without long-term current use of insulin (HCC)     ICD-10-CM: E11.9 ICD-9-CM: 250.00 Essential hypertension     ICD-10-CM: I10 
ICD-9-CM: 401.9 History of smoking     ICD-10-CM: Z87.891 ICD-9-CM: V15.82 Vitals BP Pulse Height(growth percentile) Weight(growth percentile) LMP SpO2  
 131/69 (BP 1 Location: Left arm, BP Patient Position: Sitting) 83 5' 5\" (1.651 m) 227 lb 8 oz (103.2 kg) 08/15/2017 (Approximate) 100% BMI OB Status Smoking Status 37.86 kg/m2 Having regular periods Former Smoker BMI and BSA Data Body Mass Index Body Surface Area  
 37.86 kg/m 2 2.18 m 2 Preferred Pharmacy Pharmacy Name Phone [id number] [address] [place] [address], [address] [place] [address] Ave [phone] Your Updated Medication List  
  
   
This list is accurate as of: 9/12/17  4:56 PM.  Always use your most recent med list.  
  
  
  
  
 atorvastatin 20 mg tablet Commonly known as:  LIPITOR Take 20 mg by mouth nightly. labetalol 100 mg tablet Commonly known as:  Weinberg Muta Take 100 mg by mouth two (2) times a day. Indications: hypertension  
  
 multivitamin with iron chewable tablet Commonly known as:  Yimi Lorrie Take 1 Tab by mouth daily. ursodiol 500 mg tablet Commonly known as:  SARITHA FORTE Take 1 Tab by mouth daily. Take with food. Indications: CHOLELITHIASIS PREVENTION  
  
 valsartan-hydroCHLOROthiazide 160-12.5 mg per tablet Commonly known as:  DIOVAN-HCT Take 1 Tab by mouth nightly. WELLBUTRIN 75 mg tablet Generic drug:  buPROPion Take  by mouth two (2) times a day. Prescriptions Sent to Pharmacy Refills  
 ursodiol (SARITHA FORTE) 500 mg tablet 4 Sig: Take 1 Tab by mouth daily. Take with food. Indications: CHOLELITHIASIS PREVENTION Class: Normal  
 [facility]: [id number] [address] [place] [address] Ph #: [phone] Route: Oral  
  
Follow-up Instructions Return in about 4 weeks (around 10/10/2017). Patient Instructions Your appt with Liver Specialist is 12/13/17 @ 3337 To start nurys forte for gall bladder protection. May advance diet to solid phase. Remember to measure portions, to keep the focus on increasing your protein & keeping your carbs low. Continue the use of protein shakes. To follow recommendations of dietician. Stay hydrated! Eat regularly, eat slowly & do not drink with your meals. Vitamins to be taken include your multivitamin, B12, calcium citrate, Vit D & Bcomplex. Refer to your notebook & handouts for dosages. Continue to increase cardio activity. May add resistance exercises. Continue follow-up with your PCP. Return to this office in 1 month. Call if questions/concerns prior to your office visit. Walking for Exercise: Care Instructions Your Care Instructions Walking is one of the easiest ways to get the exercise you need for good health. A brisk, 30-minute walk each day can help you feel better and have more energy. It can help you lower your risk of disease. Walking can help you keep your bones strong and your heart healthy. Check with your doctor before you start a walking plan if you have heart problems, other health issues, or you have not been active in a long time. Follow your doctor's instructions for safe levels of exercise. Follow-up care is a key part of your treatment and safety. Be sure to make and go to all appointments, and call your doctor if you are having problems. It's also a good idea to know your test results and keep a list of the medicines you take. How can you care for yourself at home? Getting started · Start slowly and set a short-term goal. For example, walk for 5 or 10 minutes every day. · Bit by bit, increase the amount you walk every day. Try for at least 30 minutes on most days of the week. You also may want to swim, bike, or do other activities.  
· If finding enough time is a problem, it is fine to be active in blocks of 10 minutes or more throughout your day and week. · To get the heart-healthy benefits of walking, you need to walk briskly enough to increase your heart rate and breathing, but not so fast that you cannot talk comfortably. · Wear comfortable shoes that fit well and provide good support for your feet and ankles. Staying with your plan · After you've made walking a habit, set a longer-term goal. You may want to set a goal of walking briskly for longer or walking farther. Experts say to do 2½ hours of moderate activity a week. A faster heartbeat is what defines moderate-level activity. · To stay motivated, walk with friends, coworkers, or pets. · Use a phone libertad or pedometer to track your steps each day. Set a goal to increase your steps. Once you get there, set a higher goal. Aim for 10,000 steps a day. · If the weather keeps you from walking outside, go for walks at the mall with a friend. Local schools and churches may have indoor gyms where you can walk. Fitting a walk into your workday · Park several blocks away from work, or get off the bus a few stops early. · Use the stairs instead of the elevator, at least for a few floors. · Suggest holding meetings with colleagues during a walk inside or outside the building. · Use the restroom that is the farthest from your desk or workstation. · Use your morning and afternoon breaks to take quick 15-minute walks. Staying safe · Know your surroundings. Walk in a well-lighted, safe place. If it is dark, walk with a partner. Wear light-colored clothing. If you can, buy a vest or jacket that reflects light. · Carry a cell phone for emergencies. · Drink plenty of water. Take a water bottle with you when you walk. This is very important if it is hot out. · Be careful not to slip on wet or icy ground. You can buy \"grippers\" for your shoes to help keep you from slipping. · Pay attention to your walking surface. Use sidewalks and paths. · If you have breathing problems like asthma or COPD, ask your doctor when it is safe for you to walk outdoors. Cold, dry air, smog, pollen, or other things in the air could cause breathing problems. Where can you learn more? Go to http://mariano-ashlee.info/. Enter R159 in the search box to learn more about \"Walking for Exercise: Care Instructions. \" Current as of: March 13, 2017 Content Version: 11.3 © 3392-0774 [employer]. Care instructions adapted under license by [employer] (which disclaims liability or warranty for this information). If you have questions about a medical condition or this instruction, always ask your healthcare professional. [address] any warranty or liability for your use of this information. Patient Instructions History Introducing \[facility]\"" & HEALTH SERVICES! Onur Thurman introduces [employer] patient portal. Now you can access parts of your medical record, email your doctor's office, and request medication refills online. 1. In your internet browser, go to [email or web address] 2. Click on the First Time User? Click Here link in the Sign In box. You will see the New Member Sign Up page. 3. Enter your [employer] Access Code exactly as it appears below. You will not need to use this code after youve completed the sign-up process. If you do not sign up before the expiration date, you must request a new code. · [employer] Access Code: [email or web address] Expires: 10/3/2017  9:36 AM 
 
4. Enter the last four digits of your Social Security Number (xxxx) and Date of Birth (mm/dd/yyyy) as indicated and click Submit. You will be taken to the next sign-up page. 5. Create a [employer] ID. This will be your [employer] login ID and cannot be changed, so think of one that is secure and easy to remember. 6. Create a [employer] password. You can change your password at any time. 7. Enter your Password Reset Question and Answer. This can be used at a later time if you forget your password. 8. Enter your e-mail address. You will receive e-mail notification when new information is available in [address] 9. Click Sign Up. You can now view and download portions of your medical record. 10. Click the Download Summary menu link to download a portable copy of your medical information. If you have questions, please visit the Frequently Asked Questions section of the [employer] website. Remember, [employer] is NOT to be used for urgent needs. For medical emergencies, dial 911. Now available from your iPhone and Android! Please provide this summary of care documentation to your next provider. Your primary care clinician is listed as Geraldo Marlow. If you have any questions after today's visit, please call [phone].

## 2017-09-14 ENCOUNTER — DOCUMENTATION ONLY (OUTPATIENT)
Dept: SURGERY | Age: 42
End: 2017-09-14

## 2017-09-14 RX ORDER — URSODIOL 300 MG/1
300 CAPSULE ORAL 2 TIMES DAILY
Qty: 60 CAP | Refills: 4 | Status: SHIPPED | OUTPATIENT
Start: 2017-09-14 | End: 2018-05-15

## 2017-09-14 NOTE — PROGRESS NOTES
Pharmacist called to say RX for nurys forte is contraindicated because patient is diabetic and has been on Metformin. Per Meche De La Torre NP she will send in a new RX for Actigall. Patient has been advised.

## 2017-10-03 ENCOUNTER — TELEPHONE (OUTPATIENT)
Dept: SURGERY | Age: 42
End: 2017-10-03

## 2017-10-03 NOTE — TELEPHONE ENCOUNTER
Per MBSAQIP 30 day follow up:  Letter sent. E-mail sent. Thank you for choosing [facility] for your care. You had surgery on August 08, 2017. We are interested in how you have been feeling since your surgery. Dr. Olive Saxena and the Department of Surgery at our hospital are members of the Metabolic and Bariatric Surgery Accreditation and Quality Improvement Program [facility]). We are gathering information on the outcomes of our patients after surgery. Please take a few minutes to answer the questions below and return this letter via mail or e-mail to [email or web address] Your answers are confidential.    ¨ Have you been to a hospital or seen by a doctor for any reason since your surgery? Yes    No    If you answered NO, you do not need to answer any more questions. If you have answered YES, please answer the following questions (use back of page if additional space is needed). 1.  Have you been seen in an outpatient clinic or doctors office after your surgery? Yes    No    a. If yes, was this visit for routine follow-up? Yes    No    b. If no, what was the reason for your visit?       c.  Date(s) of visit(s):       d. Have you experienced any health problems since your surgery? Yes    No    e. If yes, please explain:          2. Did you go to an Emergency Department (ED) or hospital after your surgery? Yes    No    a. Were you admitted? Yes    No    b. If yes, please explain:       c.  Date(s) of ED visit or hospitalization:       d.  Did you have any additional surgery(ies) during this hospitalization? Yes    No    e. If yes, what type of surgery(ies) did you have?      f.  Date(s) of surgery(ies): Your health and feedback are important to us. We greatly appreciate your response. Thank you.     Sincerely,  [facility] [employer] [facility]  [facility] [address] [facility], Tramaine Craig

## 2017-10-11 ENCOUNTER — OFFICE VISIT (OUTPATIENT)
Dept: SURGERY | Age: 42
End: 2017-10-11

## 2017-10-11 VITALS
HEIGHT: 65 IN | BODY MASS INDEX: 36.15 KG/M2 | RESPIRATION RATE: 16 BRPM | WEIGHT: 217 LBS | SYSTOLIC BLOOD PRESSURE: 113 MMHG | HEART RATE: 76 BPM | OXYGEN SATURATION: 100 % | DIASTOLIC BLOOD PRESSURE: 68 MMHG

## 2017-10-11 DIAGNOSIS — Z98.84 S/P GASTRIC BYPASS: ICD-10-CM

## 2017-10-11 DIAGNOSIS — K90.9 INTESTINAL MALABSORPTION, UNSPECIFIED TYPE: Primary | ICD-10-CM

## 2017-10-11 RX ORDER — MULTIVIT WITH MINERALS/HERBS
1 TABLET ORAL DAILY
COMMUNITY

## 2017-10-11 RX ORDER — BISMUTH SUBSALICYLATE 262 MG
1 TABLET,CHEWABLE ORAL DAILY
COMMUNITY

## 2017-10-11 NOTE — MR AVS SNAPSHOT
Visit Information Date & Time Provider Department Dept. Phone Encounter #  
 10/11/2017  8:30 AM Sarah Padilla [facility]  Your Appointments 12/11/2017  9:00 AM  
Office Visit with DIANE Sandoval [facility] ([address]) Appt Note: 4 mo po  
 [address] [facility] [address] [place] [address] [facility] [address]  
[phone]  
  
   
 [address] [place] [address]  
  
    
 12/13/2017  9:30 AM  
New Patient with Alexys Masters MD  
[address] [facility] ([address]) Appt Note: np, est pcp, referred by Dr. Nish Dean [address], [address] [place] [address] [facility] [address] [facility]  
  
   
 [address], [address] [place] [address] Upcoming Health Maintenance Date Due  
 LIPID PANEL Q1 1976 FOOT EXAM Q1 9/9/1986 MICROALBUMIN Q1 9/9/1986 EYE EXAM RETINAL OR DILATED Q1 9/9/1986 Pneumococcal 19-64 Medium Risk (1 of 1 - PPSV23) 9/9/1995 DTaP/Tdap/Td series (1 - Tdap) 9/9/1997 PAP AKA CERVICAL CYTOLOGY 9/9/1997 INFLUENZA AGE 9 TO ADULT 8/1/2017 HEMOGLOBIN A1C Q6M 2/7/2018 Allergies as of 10/11/2017  Review Complete On: 10/11/2017 By: Carin Edward No Known Allergies Current Immunizations  Never Reviewed No immunizations on file. Not reviewed this visit Vitals BP Pulse Height(growth percentile) Weight(growth percentile) SpO2 BMI  
 113/68 (BP 1 Location: Right arm, BP Patient Position: Sitting) 76 5' 5\" (1.651 m) 217 lb (98.4 kg) 100% 36.11 kg/m2 OB Status Smoking Status Having regular periods Former Smoker BMI and BSA Data Body Mass Index Body Surface Area  
 36.11 kg/m 2 2.12 m 2 Preferred Pharmacy Pharmacy Name Phone [id number], [address] [phone] Your Updated Medication List  
  
   
This list is accurate as of: 10/11/17  8:51 AM.  Always use your most recent med list.  
  
  
  
  
 atorvastatin 20 mg tablet Commonly known as:  LIPITOR Take 20 mg by mouth nightly. b complex vitamins tablet Take 1 Tab by mouth daily. BIOTIN PO Take  by mouth. CALCIUM CITRATE PO Take  by mouth. labetalol 100 mg tablet Commonly known as:  Emely Lloyd Take 100 mg by mouth two (2) times a day. Indications: hypertension  
  
 multivitamin tablet Commonly known as:  ONE A DAY Take 1 Tab by mouth daily. multivitamin with iron chewable tablet Commonly known as:  Rue  Take 1 Tab by mouth daily. NASCOBAL 500 mcg/spray Spry Generic drug:  cyanocobalamin  
by Nasal route. PROBIOTIC PO Take  by mouth. ursodiol 300 mg capsule Commonly known as:  ACTIGALL Take 1 Cap by mouth two (2) times a day. Indications: CHOLELITHIASIS PREVENTION  
  
 valsartan-hydroCHLOROthiazide 160-12.5 mg per tablet Commonly known as:  DIOVAN-HCT Take 1 Tab by mouth nightly. WELLBUTRIN 75 mg tablet Generic drug:  buPROPion Take  by mouth two (2) times a day. Patient Instructions Body Mass Index: Care Instructions Your Care Instructions Body mass index (BMI) can help you see if your weight is raising your risk for health problems. It uses a formula to compare how much you weigh with how tall you are. · A BMI lower than 18.5 is considered underweight. · A BMI between 18.5 and 24.9 is considered healthy. · A BMI between 25 and 29.9 is considered overweight. A BMI of 30 or higher is considered obese. If your BMI is in the normal range, it means that you have a lower risk for weight-related health problems.  If your BMI is in the overweight or obese range, you may be at increased risk for weight-related health problems, such as high blood pressure, heart disease, stroke, arthritis or joint pain, and diabetes. If your BMI is in the underweight range, you may be at increased risk for health problems such as fatigue, lower protection (immunity) against illness, muscle loss, bone loss, hair loss, and hormone problems. BMI is just one measure of your risk for weight-related health problems. You may be at higher risk for health problems if you are not active, you eat an unhealthy diet, or you drink too much alcohol or use tobacco products. Follow-up care is a key part of your treatment and safety. Be sure to make and go to all appointments, and call your doctor if you are having problems. It's also a good idea to know your test results and keep a list of the medicines you take. How can you care for yourself at home? · Practice healthy eating habits. This includes eating plenty of fruits, vegetables, whole grains, lean protein, and low-fat dairy. · If your doctor recommends it, get more exercise. Walking is a good choice. Bit by bit, increase the amount you walk every day. Try for at least 30 minutes on most days of the week. · Do not smoke. Smoking can increase your risk for health problems. If you need help quitting, talk to your doctor about stop-smoking programs and medicines. These can increase your chances of quitting for good. · Limit alcohol to 2 drinks a day for men and 1 drink a day for women. Too much alcohol can cause health problems. If you have a BMI higher than 25 · Your doctor may do other tests to check your risk for weight-related health problems. This may include measuring the distance around your waist. A waist measurement of more than 40 inches in men or 35 inches in women can increase the risk of weight-related health problems. · Talk with your doctor about steps you can take to stay healthy or improve your health. You may need to make lifestyle changes to lose weight and stay healthy, such as changing your diet and getting regular exercise. If you have a BMI lower than 18.5 · Your doctor may do other tests to check your risk for health problems. · Talk with your doctor about steps you can take to stay healthy or improve your health. You may need to make lifestyle changes to gain or maintain weight and stay healthy, such as getting more healthy foods in your diet and doing exercises to build muscle. Where can you learn more? Go to http://mariano-ashlee.info/. Enter S176 in the search box to learn more about \"Body Mass Index: Care Instructions. \" Current as of: January 23, 2017 Content Version: 11.3 © 9835-2470 [employer]. Care instructions adapted under license by [employer] (which disclaims liability or warranty for this information). If you have questions about a medical condition or this instruction, always ask your healthcare professional. [address] any warranty or liability for your use of this information. Introducing [facility]! Dear Roy Ormond: Thank you for requesting a [employer] account. Our records indicate that you already have an active [employer] account. You can access your account anytime at [email or web address] Did you know that you can access your hospital and ER discharge instructions at any time in [employer]? You can also review all of your test results from your hospital stay or ER visit. Additional Information If you have questions, please visit the Frequently Asked Questions section of the [employer] website at [email or web address] Remember, [employer] is NOT to be used for urgent needs. For medical emergencies, dial 911. Now available from your iPhone and Android! Please provide this summary of care documentation to your next provider. Your primary care clinician is listed as Trever Brown. If you have any questions after today's visit, please call [phone].

## 2017-10-11 NOTE — PATIENT INSTRUCTIONS
Body Mass Index: Care Instructions  Your Care Instructions    Body mass index (BMI) can help you see if your weight is raising your risk for health problems. It uses a formula to compare how much you weigh with how tall you are. · A BMI lower than 18.5 is considered underweight. · A BMI between 18.5 and 24.9 is considered healthy. · A BMI between 25 and 29.9 is considered overweight. A BMI of 30 or higher is considered obese. If your BMI is in the normal range, it means that you have a lower risk for weight-related health problems. If your BMI is in the overweight or obese range, you may be at increased risk for weight-related health problems, such as high blood pressure, heart disease, stroke, arthritis or joint pain, and diabetes. If your BMI is in the underweight range, you may be at increased risk for health problems such as fatigue, lower protection (immunity) against illness, muscle loss, bone loss, hair loss, and hormone problems. BMI is just one measure of your risk for weight-related health problems. You may be at higher risk for health problems if you are not active, you eat an unhealthy diet, or you drink too much alcohol or use tobacco products. Follow-up care is a key part of your treatment and safety. Be sure to make and go to all appointments, and call your doctor if you are having problems. It's also a good idea to know your test results and keep a list of the medicines you take. How can you care for yourself at home? · Practice healthy eating habits. This includes eating plenty of fruits, vegetables, whole grains, lean protein, and low-fat dairy. · If your doctor recommends it, get more exercise. Walking is a good choice. Bit by bit, increase the amount you walk every day. Try for at least 30 minutes on most days of the week. · Do not smoke. Smoking can increase your risk for health problems. If you need help quitting, talk to your doctor about stop-smoking programs and medicines. These can increase your chances of quitting for good. · Limit alcohol to 2 drinks a day for men and 1 drink a day for women. Too much alcohol can cause health problems. If you have a BMI higher than 25  · Your doctor may do other tests to check your risk for weight-related health problems. This may include measuring the distance around your waist. A waist measurement of more than 40 inches in men or 35 inches in women can increase the risk of weight-related health problems. · Talk with your doctor about steps you can take to stay healthy or improve your health. You may need to make lifestyle changes to lose weight and stay healthy, such as changing your diet and getting regular exercise. If you have a BMI lower than 18.5  · Your doctor may do other tests to check your risk for health problems. · Talk with your doctor about steps you can take to stay healthy or improve your health. You may need to make lifestyle changes to gain or maintain weight and stay healthy, such as getting more healthy foods in your diet and doing exercises to build muscle. Where can you learn more? Go to http://mariano-ashlee.info/. Enter S176 in the search box to learn more about \"Body Mass Index: Care Instructions. \"  Current as of: January 23, 2017  Content Version: 11.3  © 1048-2104 [employer]. Care instructions adapted under license by [employer] (which disclaims liability or warranty for this information). If you have questions about a medical condition or this instruction, always ask your healthcare professional. [place] [address] any warranty or liability for your use of this information.

## 2017-10-11 NOTE — PROGRESS NOTES
Subjective:     Brandie Davidson  is a 40 y.o. female who presents for follow-up about 2 months following laparoscopic gastric bypass surgery. She has lost a total of 30 pounds (27% of EBW) since surgery. Body mass index is 36.11 kg/(m^2). Gabo Panda Pain assessment - 0/10    Surgery related complication: NA       She reports no real issues and denies vomiting, abdominal pain, diarrhea and difficulty breathing. The patient's exercise level: moderately active. Changes in her medical history and medications have been reviewed.     Patient Active Problem List   Diagnosis Code    Morbid obesity ([facility] E66.01    Morbid obesity with body mass index of 40.0-49.9 ([facility] E66.01    Diabetes mellitus ([facility] E11.9    Hypertension I10    Hypercholesterolemia E78.00    Smoking F17.200    Depression F32.9    Sleep apnea G47.30    Irregular menses N92.6    PCOS (polycystic ovarian syndrome) E28.2    Psoriasis L40.9    Morbid obesity with BMI of 40.0-44.9, adult (HCC) E66.01, Z68.41    Intestinal malabsorption K90.9    S/P gastric bypass Z98.84    Hepatic fibrosis K74.0     Past Medical History:   Diagnosis Date    Depression     Diabetes mellitus ([address]     Dx  / HgbA1c 7.2017    Hypercholesterolemia     Hypertension     Irregular menses     Morbid obesity ([facility]     Morbid obesity with body mass index of 40.0-49.9 (HCC)     PCOS (polycystic ovarian syndrome)     Psoriasis     Sleep apnea     no CPAP    Smoking     on chantix as of 2017     Past Surgical History:   Procedure Laterality Date    HX  SECTION      x2    HX KNEE ARTHROSCOPY Right     HX TONSILLECTOMY      HX TUBAL LIGATION         Objective:     Visit Vitals    /68 (BP 1 Location: Right arm, BP Patient Position: Sitting)    Pulse 76    Resp 16    Ht 5' 5\" (1.651 m)    Wt 98.4 kg (217 lb)    SpO2 100%    BMI 36.11 kg/m2        Physical Exam:    General:  alert, cooperative, no distress, appears stated age Pulm: clear to auscultation bilaterally   Heart:  Regular rate and rhythm   Abdomen:   abdomen is soft without significant tenderness, masses, organomegaly or guarding; Incisions: healing well, no significant drainage       Assessment:     1. History of Morbid obesity, status post  laparoscopic gastric bypass surgery. Doing well; no concerns. Desires to lose at least 90 of her 113 EBW lbs. Plan:     1. Remember to measure portions, continue low carbohydrate diet  2. Tolerated transition to solids without issue  3. Remember vitamin supplements. 4. Exercise regimen appears adequate. 5. Attend support group  6. Follow-up in 2 month(s).   7. The patient understands the plan of action

## 2017-12-11 ENCOUNTER — OFFICE VISIT (OUTPATIENT)
Dept: SURGERY | Age: 42
End: 2017-12-11

## 2017-12-11 VITALS
SYSTOLIC BLOOD PRESSURE: 115 MMHG | BODY MASS INDEX: 33.29 KG/M2 | OXYGEN SATURATION: 98 % | WEIGHT: 199.8 LBS | HEIGHT: 65 IN | DIASTOLIC BLOOD PRESSURE: 68 MMHG | HEART RATE: 81 BPM

## 2017-12-11 DIAGNOSIS — Z98.84 S/P GASTRIC BYPASS: ICD-10-CM

## 2017-12-11 DIAGNOSIS — E55.9 HYPOVITAMINOSIS D: ICD-10-CM

## 2017-12-11 DIAGNOSIS — K90.9 INTESTINAL MALABSORPTION, UNSPECIFIED TYPE: Primary | ICD-10-CM

## 2017-12-11 DIAGNOSIS — I10 ESSENTIAL HYPERTENSION: ICD-10-CM

## 2017-12-11 DIAGNOSIS — E11.9 TYPE 2 DIABETES MELLITUS WITHOUT COMPLICATION, WITHOUT LONG-TERM CURRENT USE OF INSULIN (HCC): ICD-10-CM

## 2017-12-11 RX ORDER — METFORMIN HYDROCHLORIDE 500 MG/1
TABLET ORAL 2 TIMES DAILY WITH MEALS
COMMUNITY

## 2017-12-11 NOTE — PATIENT INSTRUCTIONS
Patient Instructions      1. Remember hydration goals - minimum of 64 ounces of liquids per day (dehydration is the number one reason for hospital readmission). 2. Continue to monitor carbohydrate and protein intake you need a minimum of  Grams of protein daily- remember to keep your total carbohydrates to 50 grams or less per day for best results. 3. Continue to work towards exercise goals - 60-90 minutes, 5 times a week minimum of deliberate, aerobic exercise is the ultimate goal with strength training 2 times each week. Refer to [employer] for  information. 4. Remember to take vitamins as directed. 5. Attend support group the 2nd Thursday of each month. 6. Use Miralax if you become constipated. 7. Call us at [phone] or email us through [email or web address]" with questions,     concerns or worsening of condition, we have someone on call 24 hours a day. If you are unable to reach our office, you are to go to your Primary Care Physician or the Emergency Department. Supplement Resource Guide    Importance of Protein:   Maintains lean body mass, produces antibodies to fight off infections, heals wounds, minimizes hair loss, helps to give you energy, helps with satiety, and keeping you full between meals. Importance of Calcium:  Needed for healthy bones and teeth, normal blood clotting, and nervous system functioning, higher risk of osteoporosis and bone disease with non-compliance. Importance of Multivitamins: Many functions. Supply you with extra nutrients that you may be missing from food. May lead to iron deficiency anemia, weakness, fatigue, and many other symptoms with non-compliance. Importance of B Vitamins:  Important for red blood cell formation, metabolism, energy, and helps to maintain a healthy nervous system. Protein Supplement  Find one you like now. Use immediately after surgery.    Look for:  35-50g protein each day from your protein supplement once you reach the progression diet. 0-3 g fat per serving  0-3 g sugar per serving    Protein drinks should be split in separate dosages. Recommend: Lifelong  1 year + Calcium Supplement:     Start taking within a month after surgery. Look for: Calcium Citrate Plus D (1500 mg per day)  Recommend: Citracal     .            Avoid chocolate chewable calcium. Can use chewable bariatric or [employer] brand or similar chewable. The body cannot absorb more than 500-600 mg of calcium at a time. Take for Life Multi-vitamin Supplement:      Start immediately after surgery: any complete chewable, such as: [employer]s Complete chewables. Avoid [place] [employer] sours or gummies. They lack iron and other important nutrients and also have added sugar. Continue with chewable vitamin or change to adult complete multivitamin one month after surgery. Menstruating women can take a prenatal vitamin. Make sure has at least 18 mg iron and 844-043 mcg folic acid   Vitamin B42, B Complex Vitamin, and Biotin  Start taking within a month after surgery. Vitamin B12:  1000 mcg of Vitamin B12 three times weekly    Must take sublingually (meaning you take it under your tongue) or in a liquid drop form for easy absorption. B Complex Vitamin: Take a pill or liquid drop form once daily. Biotin: This vitamin can help prevent hair loss. Recommend 5mg   (5000 mcg) a day  Biotin is Optional            Walking for Exercise: Care Instructions  Your Care Instructions    Walking is one of the easiest ways to get the exercise you need for good health. A brisk, 30-minute walk each day can help you feel better and have more energy. It can help you lower your risk of disease. Walking can help you keep your bones strong and your heart healthy.   Check with your doctor before you start a walking plan if you have heart problems, other health issues, or you have not been active in a long time. Follow your doctor's instructions for safe levels of exercise. Follow-up care is a key part of your treatment and safety. Be sure to make and go to all appointments, and call your doctor if you are having problems. It's also a good idea to know your test results and keep a list of the medicines you take. How can you care for yourself at home? Getting started  · Start slowly and set a short-term goal. For example, walk for 5 or 10 minutes every day. · Bit by bit, increase the amount you walk every day. Try for at least 30 minutes on most days of the week. You also may want to swim, bike, or do other activities. · If finding enough time is a problem, it is fine to be active in blocks of 10 minutes or more throughout your day and week. · To get the heart-healthy benefits of walking, you need to walk briskly enough to increase your heart rate and breathing, but not so fast that you cannot talk comfortably. · Wear comfortable shoes that fit well and provide good support for your feet and ankles. Staying with your plan  · After you've made walking a habit, set a longer-term goal. You may want to set a goal of walking briskly for longer or walking farther. Experts say to do 2½ hours of moderate activity a week. A faster heartbeat is what defines moderate-level activity. · To stay motivated, walk with friends, coworkers, or pets. · Use a phone libertad or pedometer to track your steps each day. Set a goal to increase your steps. Once you get there, set a higher goal. Aim for 10,000 steps a day. · If the weather keeps you from walking outside, go for walks at the mall with a friend. Local schools and churches may have indoor gyms where you can walk. Fitting a walk into your workday  · Park several blocks away from work, or get off the bus a few stops early. · Use the stairs instead of the elevator, at least for a few floors.   · Suggest holding meetings with colleagues during a walk inside or outside the building. · Use the restroom that is the farthest from your desk or workstation. · Use your morning and afternoon breaks to take quick 15-minute walks. Staying safe  · Know your surroundings. Walk in a well-lighted, safe place. If it is dark, walk with a partner. Wear light-colored clothing. If you can, buy a vest or jacket that reflects light. · Carry a cell phone for emergencies. · Drink plenty of water. Take a water bottle with you when you walk. This is very important if it is hot out. · Be careful not to slip on wet or icy ground. You can buy \"grippers\" for your shoes to help keep you from slipping. · Pay attention to your walking surface. Use sidewalks and paths. · If you have breathing problems like asthma or COPD, ask your doctor when it is safe for you to walk outdoors. Cold, dry air, smog, pollen, or other things in the air could cause breathing problems. Where can you learn more? Go to http://mariano-ashlee.info/. Enter R159 in the search box to learn more about \"Walking for Exercise: Care Instructions. \"  Current as of: March 13, 2017  Content Version: 11.4  © 6965-2477 Healthwise, [employer]. Care instructions adapted under license by [employer] (which disclaims liability or warranty for this information). If you have questions about a medical condition or this instruction, always ask your healthcare professional. [place] [address] any warranty or liability for your use of this information.

## 2017-12-11 NOTE — PROGRESS NOTES
Subjective:      Lizbeth Cornelius is a 40 y.o. female is now 4 months status post laparoscopic gastric bypass surgery. Doing well overall. She has lost a total of 47 pounds since surgery. Body mass index is 33.25 kg/(m^2). Has lost 42% of EBW. Currently on a solid food diet without difficulty, reports no real issues and denies vomiting and abdominal pain. Taking in 64oz water daily. Reviewed dietary intake and food choices are good. Meeting protein requirements. Had to add protein powder back in, was forgetting to eat. .    Is moderately active. Endocrinologist restarted metformin. Last HgA1c was 6.7%, was 6.6% last time. Lipitor being decreased soon after last lipid check. On 2 BP medications, no dizziness or lightheadedness. Bowel movements are regular. The patient is not having any pain. . The patient is compliant with multivitamins, calcium, Vit D and B12 supplements.      Weight Loss Metrics 12/11/2017 10/11/2017 9/12/2017 8/29/2017 8/15/2017 7/25/2017 7/5/2017   Today's Wt 199 lb 12.8 oz 217 lb 227 lb 8 oz 234 lb 12.8 oz 244 lb 5 oz 245 lb 247 lb   BMI 33.25 kg/m2 36.11 kg/m2 37.86 kg/m2 39.07 kg/m2 40.66 kg/m2 40.77 kg/m2 41.1 kg/m2          Comorbidities:    Hypertension: improved  Diabetes: unchanged  Obstructive Sleep Apnea: improved  Hyperlipidemia: improved  Stress Urinary Incontinence: not applicable  Gastroesophageal Reflux: not applicable  Weight related arthropathy:not applicable     Patient Active Problem List   Diagnosis Code    Morbid obesity ([facility] E66.01    Morbid obesity with body mass index of 40.0-49.9 ([facility] E66.01    Diabetes mellitus ([facility] E11.9    Hypertension I10    Hypercholesterolemia E78.00    Smoking F17.200    Depression F32.9    Sleep apnea G47.30    Irregular menses N92.6    PCOS (polycystic ovarian syndrome) E28.2    Psoriasis L40.9    Morbid obesity with BMI of 40.0-44.9, adult ([facility]) E66.01, Z68.41    Intestinal malabsorption K90.9    S/P gastric bypass Z98.84    Hepatic fibrosis K74.0        Past Medical History:   Diagnosis Date    Depression     Diabetes mellitus ([facility]     Dx 2005 / HgbA1c 7.2017    Hypercholesterolemia     Hypertension     Irregular menses     Morbid obesity ([facility]     Morbid obesity with body mass index of 40.0-49.9 (HCC)     PCOS (polycystic ovarian syndrome)     Psoriasis     Sleep apnea     no CPAP    Smoking     on chantix as of 2017       Past Surgical History:   Procedure Laterality Date    HX  SECTION      x2    HX KNEE ARTHROSCOPY Right     HX TONSILLECTOMY      HX TUBAL LIGATION         Current Outpatient Prescriptions   Medication Sig Dispense Refill    metFORMIN (GLUCOPHAGE) 500 mg tablet Take  by mouth two (2) times daily (with meals).  multivitamin (ONE A DAY) tablet Take 1 Tab by mouth daily.  LACTOBACILLUS ACIDOPHILUS (PROBIOTIC PO) Take  by mouth.  CALCIUM CITRATE PO Take  by mouth.  cyanocobalamin (NASCOBAL) 500 mcg/spray spry by Nasal route.  b complex vitamins tablet Take 1 Tab by mouth daily.  BIOTIN PO Take  by mouth.  ursodiol (ACTIGALL) 300 mg capsule Take 1 Cap by mouth two (2) times a day. Indications: CHOLELITHIASIS PREVENTION 60 Cap 4    buPROPion (WELLBUTRIN) 75 mg tablet Take  by mouth two (2) times a day.  valsartan-hydroCHLOROthiazide (DIOVAN-HCT) 160-12.5 mg per tablet Take 1 Tab by mouth nightly.  atorvastatin (LIPITOR) 20 mg tablet Take 20 mg by mouth nightly.  labetalol (NORMODYNE) 100 mg tablet Take 100 mg by mouth two (2) times a day.  Indications: hypertension         No Known Allergies    Review of Symptoms:       General - No history or complaints of unexpected fever or chills  Head/Neck - No history or complaints of headache or dizziness  Cardiac - No history or complaints of chest pain, palpitations, or shortness of breath  Pulmonary - No history or complaints of shortness of breath or productive cough  Gastrointestinal - as noted above  Genitourinary - No history or complaints of hematuria/dysuria or renal lithiasis  Musculoskeletal - No history or complaints of joint  muscular weakness  Hematologic - No history of any bleeding episodes  Neurologic - No history or complaints of  migraine headaches or neurologic symptoms        Objective:     Visit Vitals    /68 (BP 1 Location: Right arm, BP Patient Position: Sitting)    Pulse 81    Ht 5' 5\" (1.651 m)    Wt 90.6 kg (199 lb 12.8 oz)    SpO2 98%    BMI 33.25 kg/m2       General:  alert, cooperative, no distress, appears stated age   Chest: lungs clear to auscultation, breath sounds equal and symmetric, no rhonchi, rales or wheezes, no accessory muscle use   Cor:   Regular rate and rhythm, S1S2 present or without murmur or extra heart sounds   Abdomen: soft, bowel sounds active, non-tender, no masses or organomegaly   Incisions:   healing well, no drainage, no erythema, no hernia, no seroma, no swelling, no dehiscence, incision well approximated       Assessment:   History of Morbid obesity, status post laparoscopic gastric bypass surgery. Doing well postoperatively. DM- on medication  HTN - on 2 medications  AUBREY - using CPAP    Plan:     1. Increase activity to the goal of 30 minutes daily  2. Discussed patients weight loss goals and dietary choices in relation to goals. 3. Reminded to measure portions, continue high protein, low carbohydrate diet. Reminded to eat regularly, to eat slowly & not to drink with meals. 4. Continue vitamin supplementation  5. Continue current medications and follow up with PCP for management of regimen. 6. Continue cardio exercise and add resistance exercises. 60-90 minutes of aerobic activity 5 days a week and strength training 2 days each week. 7. Encouraged to attend support group   8. Patient to complete labs before next visit. Lab slip given today.   9. I have discussed this plan with patient and they verbalized understanding  10. Follow up in 2 months or sooner if patient has questions, concerns or worsening of condition, if unable to reach our office, patient should report to the ED. 6. Ms. Kev Fitzgerald has a reminder for a \"due or due soon\" health maintenance. I have asked that she contact her primary care provider for a follow-up on this health maintenance.

## 2017-12-11 NOTE — MR AVS SNAPSHOT
Visit Information Date & Time Provider Department Dept. Phone Encounter #  
 12/11/2017  9:00 AM DIANE [facility] [phone] Follow-up Instructions Return in about 2 months (around 2/11/2018). Your Appointments 12/13/2017  9:30 AM  
New Patient with Lisa Zuniga MD  
[id number] [facility] ([facility]) Appt Note: np, est pcp, referred by Dr. Canelo Llamas [facility], [facility] [address], [address] [place] [address] Upcoming Health Maintenance Date Due  
 LIPID PANEL Q1 1976 FOOT EXAM Q1 9/9/1986 MICROALBUMIN Q1 9/9/1986 EYE EXAM RETINAL OR DILATED Q1 9/9/1986 Pneumococcal 19-64 Medium Risk (1 of 1 - PPSV23) 9/9/1995 DTaP/Tdap/Td series (1 - Tdap) 9/9/1997 PAP AKA CERVICAL CYTOLOGY 9/9/1997 Influenza Age 5 to Adult 8/1/2017 HEMOGLOBIN A1C Q6M 2/7/2018 Allergies as of 12/11/2017  Review Complete On: 12/11/2017 By: Wyatt Kuhn NP No Known Allergies Current Immunizations  Never Reviewed No immunizations on file. Not reviewed this visit You Were Diagnosed With   
  
 Codes Comments Intestinal malabsorption, unspecified type    -  Primary ICD-10-CM: K90.9 ICD-9-CM: 579.9 S/P gastric bypass     ICD-10-CM: C39.33 ICD-9-CM: V45.86 Type 2 diabetes mellitus without complication, without long-term current use of insulin (HCC)     ICD-10-CM: E11.9 ICD-9-CM: 250.00 Essential hypertension     ICD-10-CM: I10 
ICD-9-CM: 401.9 Hypovitaminosis D     ICD-10-CM: E55.9 ICD-9-CM: 268.9 Vitals BP Pulse Height(growth percentile) SpO2 OB Status Smoking Status 115/68 (BP 1 Location: Right arm, BP Patient Position: Sitting) 81 5' 5\" (1.651 m) 98% Having regular periods Former Smoker Preferred Pharmacy [facility] Name Phone [id number] [address] [place] [address] [place], [address] [phone] Your Updated Medication List  
  
   
This list is accurate as of: 12/11/17  9:31 AM.  Always use your most recent med list.  
  
  
  
  
 atorvastatin 20 mg tablet Commonly known as:  LIPITOR Take 20 mg by mouth nightly. b complex vitamins tablet Take 1 Tab by mouth daily. BIOTIN PO Take  by mouth. CALCIUM CITRATE PO Take  by mouth. labetalol 100 mg tablet Commonly known as:  Casie Runner Take 100 mg by mouth two (2) times a day. Indications: hypertension  
  
 metFORMIN 500 mg tablet Commonly known as:  GLUCOPHAGE Take  by mouth two (2) times daily (with meals). multivitamin tablet Commonly known as:  ONE A DAY Take 1 Tab by mouth daily. NASCOBAL 500 mcg/spray Spry Generic drug:  cyanocobalamin  
by Nasal route. PROBIOTIC PO Take  by mouth. ursodiol 300 mg capsule Commonly known as:  ACTIGALL Take 1 Cap by mouth two (2) times a day. Indications: CHOLELITHIASIS PREVENTION  
  
 valsartan-hydroCHLOROthiazide 160-12.5 mg per tablet Commonly known as:  DIOVAN-HCT Take 1 Tab by mouth nightly. WELLBUTRIN 75 mg tablet Generic drug:  buPROPion Take  by mouth two (2) times a day. Follow-up Instructions Return in about 2 months (around 2/11/2018). To-Do List   
 12/11/2017 Lab:  CBC WITH AUTOMATED DIFF   
  
 12/11/2017 Lab:  FERRITIN   
  
 12/11/2017 Lab:  IRON   
  
 12/11/2017 Lab:  METABOLIC PANEL, COMPREHENSIVE   
  
 12/11/2017 Lab:  VITAMIN B1, WHOLE BLOOD   
  
 12/11/2017 Lab:  VITAMIN B12 & FOLATE   
  
 12/11/2017 Lab:  VITAMIN D, 25 HYDROXY Introducing [address]! Dear Ceferino Gomez: Thank you for requesting a Yakarouler account. Our records indicate that you already have an active Yakarouler account. You can access your account anytime at [email or web address] Did you know that you can access your hospital and ER discharge instructions at any time in [facility]Spring? You can also review all of your test results from your hospital stay or ER visit. Additional Information If you have questions, please visit the Frequently Asked Questions section of the HealthSpring website at [email or web address] Remember, HealthSpring is NOT to be used for urgent needs. For medical emergencies, dial 911. Now available from your iPhone and Android! Please provide this summary of care documentation to your next provider. Your primary care clinician is listed as Karly Alvarez. If you have any questions after today's visit, please call [phone].

## 2017-12-13 ENCOUNTER — OFFICE VISIT (OUTPATIENT)
Dept: HEMATOLOGY | Age: 42
End: 2017-12-13

## 2017-12-13 ENCOUNTER — HOSPITAL ENCOUNTER (OUTPATIENT)
Dept: LAB | Age: 42
Discharge: HOME | End: 2017-12-13
Payer: COMMERCIAL

## 2017-12-13 VITALS
WEIGHT: 198.4 LBS | DIASTOLIC BLOOD PRESSURE: 78 MMHG | TEMPERATURE: 97.6 F | HEART RATE: 82 BPM | HEIGHT: 65 IN | SYSTOLIC BLOOD PRESSURE: 109 MMHG | RESPIRATION RATE: 16 BRPM | OXYGEN SATURATION: 99 % | BODY MASS INDEX: 33.05 KG/M2

## 2017-12-13 DIAGNOSIS — K75.81 NASH (NONALCOHOLIC STEATOHEPATITIS): ICD-10-CM

## 2017-12-13 DIAGNOSIS — K75.81 NASH (NONALCOHOLIC STEATOHEPATITIS): Primary | ICD-10-CM

## 2017-12-13 PROBLEM — E66.01 MORBID OBESITY WITH BMI OF 40.0-44.9, ADULT (HCC): Status: RESOLVED | Noted: 2017-08-15 | Resolved: 2017-12-13

## 2017-12-13 PROBLEM — K74.00 HEPATIC FIBROSIS: Status: RESOLVED | Noted: 2017-08-29 | Resolved: 2017-12-13

## 2017-12-13 LAB
ALBUMIN SERPL-MCNC: 3.7 G/DL (ref 3.4–5)
ALBUMIN/GLOB SERPL: 1 {RATIO} (ref 0.8–1.7)
ALP SERPL-CCNC: 82 U/L (ref 45–117)
ALT SERPL-CCNC: 28 U/L (ref 13–56)
ANION GAP SERPL CALC-SCNC: 12 MMOL/L (ref 3–18)
AST SERPL-CCNC: 20 U/L (ref 15–37)
BASOPHILS # BLD: 0 K/UL (ref 0–0.06)
BASOPHILS NFR BLD: 0 % (ref 0–2)
BILIRUB DIRECT SERPL-MCNC: 0.1 MG/DL (ref 0–0.2)
BILIRUB SERPL-MCNC: 0.4 MG/DL (ref 0.2–1)
BUN SERPL-MCNC: 8 MG/DL (ref 7–18)
BUN/CREAT SERPL: 13 (ref 12–20)
CALCIUM SERPL-MCNC: 9.8 MG/DL (ref 8.5–10.1)
CHLORIDE SERPL-SCNC: 105 MMOL/L (ref 100–108)
CO2 SERPL-SCNC: 26 MMOL/L (ref 21–32)
CREAT SERPL-MCNC: 0.64 MG/DL (ref 0.6–1.3)
DIFFERENTIAL METHOD BLD: ABNORMAL
EOSINOPHIL # BLD: 0.3 K/UL (ref 0–0.4)
EOSINOPHIL NFR BLD: 3 % (ref 0–5)
ERYTHROCYTE [DISTWIDTH] IN BLOOD BY AUTOMATED COUNT: 13.2 % (ref 11.6–14.5)
GLOBULIN SER CALC-MCNC: 3.6 G/DL (ref 2–4)
GLUCOSE SERPL-MCNC: 151 MG/DL (ref 74–99)
HCT VFR BLD AUTO: 40.2 % (ref 35–45)
HGB BLD-MCNC: 13 G/DL (ref 12–16)
LYMPHOCYTES # BLD: 2.8 K/UL (ref 0.9–3.6)
LYMPHOCYTES NFR BLD: 27 % (ref 21–52)
MCH RBC QN AUTO: 29.1 PG (ref 24–34)
MCHC RBC AUTO-ENTMCNC: 32.3 G/DL (ref 31–37)
MCV RBC AUTO: 90.1 FL (ref 74–97)
MONOCYTES # BLD: 0.7 K/UL (ref 0.05–1.2)
MONOCYTES NFR BLD: 6 % (ref 3–10)
NEUTS SEG # BLD: 6.7 K/UL (ref 1.8–8)
NEUTS SEG NFR BLD: 64 % (ref 40–73)
PLATELET # BLD AUTO: 382 K/UL (ref 135–420)
PMV BLD AUTO: 12.1 FL (ref 9.2–11.8)
POTASSIUM SERPL-SCNC: 4.3 MMOL/L (ref 3.5–5.5)
PROT SERPL-MCNC: 7.3 G/DL (ref 6.4–8.2)
RBC # BLD AUTO: 4.46 M/UL (ref 4.2–5.3)
SODIUM SERPL-SCNC: 143 MMOL/L (ref 136–145)
WBC # BLD AUTO: 10.5 K/UL (ref 4.6–13.2)

## 2017-12-13 PROCEDURE — 82390 ASSAY OF CERULOPLASMIN: CPT | Performed by: INTERNAL MEDICINE

## 2017-12-13 PROCEDURE — 85025 COMPLETE CBC W/AUTO DIFF WBC: CPT | Performed by: INTERNAL MEDICINE

## 2017-12-13 PROCEDURE — 86704 HEP B CORE ANTIBODY TOTAL: CPT | Performed by: INTERNAL MEDICINE

## 2017-12-13 PROCEDURE — 80048 BASIC METABOLIC PNL TOTAL CA: CPT | Performed by: INTERNAL MEDICINE

## 2017-12-13 PROCEDURE — 87340 HEPATITIS B SURFACE AG IA: CPT | Performed by: INTERNAL MEDICINE

## 2017-12-13 PROCEDURE — 86706 HEP B SURFACE ANTIBODY: CPT | Performed by: INTERNAL MEDICINE

## 2017-12-13 PROCEDURE — 86803 HEPATITIS C AB TEST: CPT | Performed by: INTERNAL MEDICINE

## 2017-12-13 PROCEDURE — 36415 COLL VENOUS BLD VENIPUNCTURE: CPT | Performed by: INTERNAL MEDICINE

## 2017-12-13 PROCEDURE — 80076 HEPATIC FUNCTION PANEL: CPT | Performed by: INTERNAL MEDICINE

## 2017-12-13 PROCEDURE — 86708 HEPATITIS A ANTIBODY: CPT | Performed by: INTERNAL MEDICINE

## 2017-12-13 NOTE — PROGRESS NOTES
1. Have you been to the ER, urgent care clinic since your last visit? Hospitalized since your last visit? No    2. Have you seen or consulted any other health care providers outside of the [address] [place] [address] since your last visit? Include any pap smears or colon screening.  No

## 2017-12-13 NOTE — MR AVS SNAPSHOT
Visit Information Date & Time Provider Department Dept. Phone Encounter #  
 12/13/2017  9:30 AM Filemon Molina MD [facility] [address] of  [address] [id number] Follow-up Instructions Return in about 3 months (around 3/13/2018) for NP. Your Appointments 2/12/2018  8:20 AM  
Office Visit with MD Rak[facility] Rober Farris ([facility]) Appt Note: 6 mo po  
 [address] [place] [address]  
  
   
 [address] [facility] Upcoming Health Maintenance Date Due  
 LIPID PANEL Q1 1976 FOOT EXAM Q1 9/9/1986 MICROALBUMIN Q1 9/9/1986 EYE EXAM RETINAL OR DILATED Q1 9/9/1986 Pneumococcal 19-64 Medium Risk (1 of 1 - PPSV23) 9/9/1995 DTaP/Tdap/Td series (1 - Tdap) 9/9/1997 PAP AKA CERVICAL CYTOLOGY 9/9/1997 Influenza Age 5 to Adult 8/1/2017 HEMOGLOBIN A1C Q6M 2/7/2018 Allergies as of 12/13/2017  Review Complete On: 12/13/2017 By: Filemon Molina MD  
 No Known Allergies Current Immunizations  Never Reviewed No immunizations on file. Not reviewed this visit You Were Diagnosed With   
  
 Codes Comments BENOIT (nonalcoholic steatohepatitis)    -  Primary ICD-10-CM: J16.56 ICD-9-CM: 571.8 Vitals BP Pulse Temp Resp Height(growth percentile) Weight(growth percentile) 109/78 (BP 1 Location: Left arm, BP Patient Position: Sitting) 82 97.6 °F (36.4 °C) (Tympanic) 16 5' 5\" (1.651 m) 198 lb 6.4 oz (90 kg) LMP SpO2 BMI OB Status Smoking Status 11/25/2017 99% 33.02 kg/m2 Having regular periods Former Smoker BMI and BSA Data Body Mass Index Body Surface Area 33.02 kg/m 2 2.03 m 2 Preferred Pharmacy Pharmacy Name Phone [phone] [address] [place] [address], [address] [phone] Your Updated Medication List  
  
   
 This list is accurate as of: 12/13/17 11:03 AM.  Always use your most recent med list.  
  
  
  
  
 atorvastatin 20 mg tablet Commonly known as:  LIPITOR Take 20 mg by mouth nightly. b complex vitamins tablet Take 1 Tab by mouth daily. BIOTIN PO Take  by mouth. CALCIUM CITRATE PO Take  by mouth. labetalol 100 mg tablet Commonly known as:  Gisela Liming Take 100 mg by mouth two (2) times a day. Indications: hypertension  
  
 metFORMIN 500 mg tablet Commonly known as:  GLUCOPHAGE Take  by mouth two (2) times daily (with meals). multivitamin tablet Commonly known as:  ONE A DAY Take 1 Tab by mouth daily. NASCOBAL 500 mcg/spray Spry Generic drug:  cyanocobalamin  
by Nasal route. PROBIOTIC PO Take  by mouth. ursodiol 300 mg capsule Commonly known as:  ACTIGALL Take 1 Cap by mouth two (2) times a day. Indications: CHOLELITHIASIS PREVENTION  
  
 valsartan-hydroCHLOROthiazide 160-12.5 mg per tablet Commonly known as:  DIOVAN-HCT Take 1 Tab by mouth nightly. WELLBUTRIN 75 mg tablet Generic drug:  buPROPion Take  by mouth two (2) times a day. Follow-up Instructions Return in about 3 months (around 3/13/2018) for NP. To-Do List   
 12/13/2017 Imaging:  US ABD LTD W ELASTOGRAPHY Introducing [facility]! Dear Vanessa Mills: Thank you for requesting a Gather account. Our records indicate that you already have an active Gather account. You can access your account anytime at [email or web address] Did you know that you can access your hospital and ER discharge instructions at any time in Gather? You can also review all of your test results from your hospital stay or ER visit. Additional Information If you have questions, please visit the Frequently Asked Questions section of the Gather website at [email or web address] Remember, MyChart is NOT to be used for urgent needs. For medical emergencies, dial 911. Now available from your iPhone and Android! Please provide this summary of care documentation to your next provider. Your primary care clinician is listed as Jason Perkins. If you have any questions after today's visit, please call [phone].

## 2017-12-13 NOTE — PROGRESS NOTES
[address] MD Ananda, Ana Maria Anderson, [place] Shannan Sharp, [place]       Cliff Cardona, RITO Membreno, [facility]   Madalyn Akers, DIANE White NP        at [address], [address], [address]     [phone]     FAX: [phone]    at [facility], [address] [place] [address], [address]     [phone]     FAX: [phone]         Patient Care Team:  Nori Arreola MD as PCP - General (Internal Medicine)  Brittany Ball MD (Endocrinology)  Rachelle Frances MD (General Surgery)      Problem List  Date Reviewed: 12/13/2017          Codes Class Noted    Intestinal malabsorption ICD-10-CM: K90.9  ICD-9-CM: 579.9  8/29/2017        S/P gastric bypass ICD-10-CM: Z98.84  ICD-9-CM: V45.86  8/29/2017    Overview Signed 8/29/2017  2:01 PM by Eduard Santa     8/15/2017             Psoriasis ICD-10-CM: L40.9  ICD-9-CM: 696.1  Unknown        Morbid obesity ([facility] ICD-10-CM: E66.01  ICD-9-CM: 278.01  Unknown        Diabetes mellitus ([facility] ICD-10-CM: E11.9  ICD-9-CM: 250.00  Unknown    Overview Signed 3/17/2017 10:00 AM by TALISHA Chawla     Dx 2005             Hypertension ICD-10-CM: I10  ICD-9-CM: 401.9  Unknown        Hypercholesterolemia ICD-10-CM: E78.00  ICD-9-CM: 272.0  Unknown        Depression ICD-10-CM: F32.9  ICD-9-CM: 577  Unknown        Sleep apnea ICD-10-CM: G47.30  ICD-9-CM: 780.57  Unknown    Overview Signed 3/17/2017 10:01 AM by TALISHA Chawla     uses c-pap             Irregular menses ICD-10-CM: N92.6  ICD-9-CM: 626.4  Unknown        PCOS (polycystic ovarian syndrome) ICD-10-CM: E28.2  ICD-9-CM: 256.4  Unknown                The physicians listed above have asked me to see Rayna Vizcaino in consultation regarding BENOIT and its management.   All medical records sent by the referring physicians were reviewed including imaging studies and pathology. The patient is a 40 y.o.  female who was found to have BENOIT on liver biopsy performed during obesity surgery. Imaging of the liver was not performed prior to obesity surgery. A liver biopsy was performed in 8/2017. This demonstrated BENOIT with pericellular fibrosis. The most recent laboratory studies indicate that the liver transaminases are normal, ALP is normal, tests of hepatic synthetic and metabolic function are normal, and the platelet count is normal.    The patient has lost a total of 48 pounds in the past 4 months since undergoing weight reduction surgery. The patient has no symptoms which could be attributed to the liver disorder. The patient completes all daily activities without any functional limitations. The patient has not experienced fatigue, pain in the right side over the liver,       ALLERGIES  No Known Allergies    MEDICATIONS  Current Outpatient Prescriptions   Medication Sig    metFORMIN (GLUCOPHAGE) 500 mg tablet Take  by mouth two (2) times daily (with meals).  multivitamin (ONE A DAY) tablet Take 1 Tab by mouth daily.  LACTOBACILLUS ACIDOPHILUS (PROBIOTIC PO) Take  by mouth.  CALCIUM CITRATE PO Take  by mouth.  cyanocobalamin (NASCOBAL) 500 mcg/spray spry by Nasal route.  b complex vitamins tablet Take 1 Tab by mouth daily.  BIOTIN PO Take  by mouth.  ursodiol (ACTIGALL) 300 mg capsule Take 1 Cap by mouth two (2) times a day. Indications: CHOLELITHIASIS PREVENTION    buPROPion (WELLBUTRIN) 75 mg tablet Take  by mouth two (2) times a day.  valsartan-hydroCHLOROthiazide (DIOVAN-HCT) 160-12.5 mg per tablet Take 1 Tab by mouth nightly.  atorvastatin (LIPITOR) 20 mg tablet Take 20 mg by mouth nightly.  labetalol (NORMODYNE) 100 mg tablet Take 100 mg by mouth two (2) times a day. Indications: hypertension     No current facility-administered medications for this visit.         SYSTEM REVIEW NOT RELATED TO LIVER DISEASE OR REVIEWED ABOVE:  Constitution systems: Negative for fever, chills, weight gain, weight loss. Eyes: Negative for visual changes. ENT: Negative for sore throat, painful swallowing. Respiratory: Negative for cough, hemoptysis, SOB. Cardiology: Negative for chest pain, palpitations. GI:  Negative for constipation or diarrhea. : Negative for urinary frequency, dysuria, hematuria, nocturia. Skin: Negative for rash. Hematology: Negative for easy bruising, blood clots. Musculo-skelatal: Negative for back pain, muscle pain, weakness. Neurologic: Negative for headaches, dizziness, vertigo, memory problems not related to HE. Psychology: Negative for anxiety, depression. FAMILY HISTORY:  The father has the following chronic diseases: HTN, sleep apnea. The mother has the following chronic diseases: DM, HTN, gastric bypass. There is no family history of liver disease. SOCIAL HISTORY:  The patient is . The patient has 2 children,   The patient stopped using tobacco products in 7/2017. The patient has never consumed significant amounts of alcohol. The patient currently works full time . PHYSICAL EXAMINATION:  Visit Vitals    /78 (BP 1 Location: Left arm, BP Patient Position: Sitting)    Pulse 82    Temp 97.6 °F (36.4 °C) (Tympanic)    Resp 16    Ht 5' 5\" (1.651 m)    Wt 198 lb 6.4 oz (90 kg)    LMP 11/25/2017    SpO2 99%    BMI 33.02 kg/m2     General: No acute distress. Eyes: Sclera anicteric. ENT: No oral lesions. Thyroid normal.  Nodes: No adenopathy. Skin: No spider angiomata. No jaundice. No palmar erythema. Respiratory: Lungs clear to auscultation. Cardiovascular: Regular heart rate. No murmurs. No JVD. Abdomen: Soft non-tender. Liver size normal to percussion/palpation. Spleen not palpable. No obvious ascites. Extremities: No edema. No muscle wasting. No gross arthritic changes.   Neurologic: Alert and oriented. Cranial nerves grossly intact. No asterixis. LABORATORY STUDIES:  Liver [place] of [address] Units 12/13/2017 8/7/2017   WBC 4.6 - 13.2 K/uL 10.5 11.5   ANC 1.8 - 8.0 K/UL 6.7 7.1   HGB 12.0 - 16.0 g/dL 13.0 13.3    - 420 K/uL 382 414   AST 15 - 37 U/L 20 23   ALT 13 - 56 U/L 28 38   Alk Phos 45 - 117 U/L 82 39 (L)   Bili, Total 0.2 - 1.0 MG/DL 0.4 0.3   Bili, Direct 0.0 - 0.2 MG/DL 0.1    Albumin 3.4 - 5.0 g/dL 3.7 3.5   BUN 7.0 - 18 MG/DL 8 8   Creat 0.6 - 1.3 MG/DL 0.64 0.75   Na 136 - 145 mmol/L 143 138   K 3.5 - 5.5 mmol/L 4.3 4.2   Cl 100 - 108 mmol/L 105 100   CO2 21 - 32 mmol/L 26 26   Glucose 74 - 99 mg/dL 151 (H) 113 (H)     SEROLOGIES:  Serologies Latest Ref Rng & Units 12/13/2017   Hep A Ab, Total NEGATIVE   Positive (A)   Hep B Surface Ag <1.00 Index <0.10   Hep B Surface Ag Interp NEG   NEGATIVE   Hep B Core Ab, Total NEGATIVE   NEGATIVE   Hep B Surface Ab >10.0 mIU/mL <3.10 (L)   Hep B Surface Ab Interp POS   NEGATIVE (A)   Hep C Ab 0.0 - 0.9 s/co ratio <0.1   Ceruloplasmin 19.0 - 39.0 mg/dL 34.9     LIVER HISTOLOGY:  8/2017. Slides reviewed by MLS. BENOIT. 50% macro and micovesicular steatosis. Many ballooning. Mild inflammation. Portal fibrosis. ENDOSCOPIC PROCEDURES:  Not available or performed    RADIOLOGY:  Not available or performed    OTHER TESTING:  Not available or performed    ASSESSMENT AND PLAN:  BENOIT with portal fibrosis. Liver transaminases are normal.  Alkaline phosphate is normal.  Liver function is normal.  The platelet count is normal.      Will perform laboratory testing to monitor liver function and degree of liver injury. This included BMP, hepatic panel, CBC with platelet count,     Will perform serologic and virologic studies to assess for other causes of chronic liver disease. Will perform imaging of the liver with ultrasound.       The patient has already had surgical therapy for weight reduction and all features of metabolic syndrome should now improve including NAFLD. We will continue to monitor the patient at periodic intervals. If weight loss is successful the fat and fibrosis will resolve from the liver. We would then repeat an ultrasound and elastography to see if this also returns to normal.      The patient was directed to continue all current medications at the current dosages. There are no contraindications for the patient to take any medications that are necessary for treatment of other medical issues including medications for diabetes mellitus and hypercholesterolemia. The patient was counseled regarding alcohol consumption and told that persons with gastric surgery are at increased risk to develop cirrhosis from alcohol. Vaccination for viral hepatitis B is recommended since the patient has no serologic evidence of previous exposure or vaccination with immunity. Vaccination for viral hepatitis A is not required. The patient has serologic evidence of prior exposure or vaccination with immunity. All of the above issues were discussed with the patient. All questions were answered. The patient expressed a clear understanding of the above. [employer] [place] [address] in 3 months to assess the impact of weight loss on fatty liver.       Diallo Parker MD  Liver [place] of [address] [place] [address], [address] [place] [address] Street [address] [phone]

## 2017-12-14 LAB
CERULOPLASMIN SERPL-MCNC: 34.9 MG/DL (ref 19–39)
COMMENT, 144067: NORMAL
HAV AB SER QL IA: POSITIVE
HBV CORE AB SERPL QL IA: NEGATIVE
HBV SURFACE AB SER QL IA: NEGATIVE
HBV SURFACE AB SERPL IA-ACNC: <3.1 MIU/ML
HBV SURFACE AG SER QL: <0.1 INDEX
HBV SURFACE AG SER QL: NEGATIVE
HCV AB S/CO SERPL IA: <0.1 S/CO RATIO (ref 0–0.9)
HEP BS AB COMMENT,HBSAC: ABNORMAL

## 2018-02-12 ENCOUNTER — OFFICE VISIT (OUTPATIENT)
Dept: SURGERY | Age: 43
End: 2018-02-12

## 2018-02-12 ENCOUNTER — HOSPITAL ENCOUNTER (OUTPATIENT)
Dept: LAB | Age: 43
Discharge: HOME | End: 2018-02-12
Payer: COMMERCIAL

## 2018-02-12 VITALS
DIASTOLIC BLOOD PRESSURE: 72 MMHG | WEIGHT: 182 LBS | BODY MASS INDEX: 30.32 KG/M2 | HEIGHT: 65 IN | HEART RATE: 76 BPM | OXYGEN SATURATION: 100 % | SYSTOLIC BLOOD PRESSURE: 115 MMHG | RESPIRATION RATE: 16 BRPM

## 2018-02-12 DIAGNOSIS — Z98.84 S/P GASTRIC BYPASS: ICD-10-CM

## 2018-02-12 DIAGNOSIS — K90.9 INTESTINAL MALABSORPTION, UNSPECIFIED TYPE: Primary | ICD-10-CM

## 2018-02-12 DIAGNOSIS — E55.9 HYPOVITAMINOSIS D: ICD-10-CM

## 2018-02-12 DIAGNOSIS — K90.9 INTESTINAL MALABSORPTION, UNSPECIFIED TYPE: ICD-10-CM

## 2018-02-12 LAB
25(OH)D3 SERPL-MCNC: 58.4 NG/ML (ref 30–100)
ALBUMIN SERPL-MCNC: 3.6 G/DL (ref 3.4–5)
ALBUMIN/GLOB SERPL: 1 {RATIO} (ref 0.8–1.7)
ALP SERPL-CCNC: 80 U/L (ref 45–117)
ALT SERPL-CCNC: 27 U/L (ref 13–56)
ANION GAP SERPL CALC-SCNC: 10 MMOL/L (ref 3–18)
AST SERPL-CCNC: 17 U/L (ref 15–37)
BASOPHILS # BLD: 0.1 K/UL (ref 0–0.06)
BASOPHILS NFR BLD: 1 % (ref 0–2)
BILIRUB SERPL-MCNC: 0.4 MG/DL (ref 0.2–1)
BUN SERPL-MCNC: 16 MG/DL (ref 7–18)
BUN/CREAT SERPL: 21 (ref 12–20)
CALCIUM SERPL-MCNC: 9.5 MG/DL (ref 8.5–10.1)
CHLORIDE SERPL-SCNC: 102 MMOL/L (ref 100–108)
CO2 SERPL-SCNC: 30 MMOL/L (ref 21–32)
CREAT SERPL-MCNC: 0.75 MG/DL (ref 0.6–1.3)
DIFFERENTIAL METHOD BLD: ABNORMAL
EOSINOPHIL # BLD: 0.5 K/UL (ref 0–0.4)
EOSINOPHIL NFR BLD: 5 % (ref 0–5)
ERYTHROCYTE [DISTWIDTH] IN BLOOD BY AUTOMATED COUNT: 13.6 % (ref 11.6–14.5)
FERRITIN SERPL-MCNC: 41 NG/ML (ref 8–388)
FOLATE SERPL-MCNC: >20 NG/ML (ref 3.1–17.5)
GLOBULIN SER CALC-MCNC: 3.5 G/DL (ref 2–4)
GLUCOSE SERPL-MCNC: 107 MG/DL (ref 74–99)
HCT VFR BLD AUTO: 40.5 % (ref 35–45)
HGB BLD-MCNC: 13.3 G/DL (ref 12–16)
IRON SERPL-MCNC: 109 UG/DL (ref 50–175)
LYMPHOCYTES # BLD: 2.5 K/UL (ref 0.9–3.6)
LYMPHOCYTES NFR BLD: 25 % (ref 21–52)
MCH RBC QN AUTO: 29.8 PG (ref 24–34)
MCHC RBC AUTO-ENTMCNC: 32.8 G/DL (ref 31–37)
MCV RBC AUTO: 90.6 FL (ref 74–97)
MONOCYTES # BLD: 0.5 K/UL (ref 0.05–1.2)
MONOCYTES NFR BLD: 5 % (ref 3–10)
NEUTS SEG # BLD: 6.2 K/UL (ref 1.8–8)
NEUTS SEG NFR BLD: 64 % (ref 40–73)
PLATELET # BLD AUTO: 326 K/UL (ref 135–420)
PMV BLD AUTO: 11 FL (ref 9.2–11.8)
POTASSIUM SERPL-SCNC: 4.8 MMOL/L (ref 3.5–5.5)
PROT SERPL-MCNC: 7.1 G/DL (ref 6.4–8.2)
RBC # BLD AUTO: 4.47 M/UL (ref 4.2–5.3)
SODIUM SERPL-SCNC: 142 MMOL/L (ref 136–145)
VIT B12 SERPL-MCNC: 1177 PG/ML (ref 211–911)
WBC # BLD AUTO: 9.6 K/UL (ref 4.6–13.2)

## 2018-02-12 PROCEDURE — 82306 VITAMIN D 25 HYDROXY: CPT | Performed by: NURSE PRACTITIONER

## 2018-02-12 PROCEDURE — 80053 COMPREHEN METABOLIC PANEL: CPT | Performed by: NURSE PRACTITIONER

## 2018-02-12 PROCEDURE — 85025 COMPLETE CBC W/AUTO DIFF WBC: CPT | Performed by: NURSE PRACTITIONER

## 2018-02-12 PROCEDURE — 82728 ASSAY OF FERRITIN: CPT | Performed by: NURSE PRACTITIONER

## 2018-02-12 PROCEDURE — 36415 COLL VENOUS BLD VENIPUNCTURE: CPT | Performed by: NURSE PRACTITIONER

## 2018-02-12 PROCEDURE — 84425 ASSAY OF VITAMIN B-1: CPT | Performed by: NURSE PRACTITIONER

## 2018-02-12 PROCEDURE — 83540 ASSAY OF IRON: CPT | Performed by: NURSE PRACTITIONER

## 2018-02-12 PROCEDURE — 82607 VITAMIN B-12: CPT | Performed by: NURSE PRACTITIONER

## 2018-02-12 RX ORDER — VALSARTAN 160 MG/1
TABLET ORAL DAILY
COMMUNITY

## 2018-02-12 NOTE — PROGRESS NOTES
6 month follow-up    Subjective:     Carlos Carmen  is a 40 y.o. female who presents for follow-up about 6 months following laparoscopic gastric bypass surgery. She has lost a total of 65 pounds since surgery. Body mass index is 30.29 kg/(m^2). . EBWL is (58%). The patient presents today to assess their progress toward their goal of weight loss and to address any issues that may be present. Today the patient and I have reviewed their diet and how appropriate their food choices are. The following issues have been identified  - none from a surgical standpoint. Pain assessment - 0/10  . Surgery related complication: NA       She reports no real issues and denies vomiting, abdominal pain, diarrhea and difficulty breathing. The patient's exercise level: moderately active. Changes in her medical history and medications have been reviewed. Patient Active Problem List   Diagnosis Code    Morbid obesity ([facility] [address] E66.01    Diabetes mellitus ([facility] [address] E11.9    Hypertension I10    Hypercholesterolemia E78.00    Depression F32.9    Sleep apnea G47.30    Irregular menses N92.6    PCOS (polycystic ovarian syndrome) E28.2    Psoriasis L40.9    Intestinal malabsorption K90.9    S/P gastric bypass Z98.84     Past Medical History:   Diagnosis Date    Depression     Diabetes mellitus ([facility] [address]     Dx  / HgbA1c 7.2017    Hypercholesterolemia     Hypertension     Irregular menses     Morbid obesity ([facility] [address]     Morbid obesity with body mass index of 40.0-49.9 (HCC)     PCOS (polycystic ovarian syndrome)     Psoriasis     Sleep apnea     no CPAP    Smoking     on chantix as of 2017     Past Surgical History:   Procedure Laterality Date    HX  SECTION      x2    HX KNEE ARTHROSCOPY Right     HX TONSILLECTOMY      HX TUBAL LIGATION       Current Outpatient Prescriptions   Medication Sig Dispense Refill    valsartan (DIOVAN) 160 mg tablet Take  by mouth daily.       SERTRALINE HCL (ZOLOFT PO) Take  by mouth.  metFORMIN (GLUCOPHAGE) 500 mg tablet Take  by mouth two (2) times daily (with meals).  multivitamin (ONE A DAY) tablet Take 1 Tab by mouth daily.  LACTOBACILLUS ACIDOPHILUS (PROBIOTIC PO) Take  by mouth.  CALCIUM CITRATE PO Take  by mouth.  cyanocobalamin (NASCOBAL) 500 mcg/spray spry by Nasal route.  b complex vitamins tablet Take 1 Tab by mouth daily.  BIOTIN PO Take  by mouth.  ursodiol (ACTIGALL) 300 mg capsule Take 1 Cap by mouth two (2) times a day. Indications: CHOLELITHIASIS PREVENTION 60 Cap 4    buPROPion (WELLBUTRIN) 75 mg tablet Take  by mouth two (2) times a day.  atorvastatin (LIPITOR) 20 mg tablet Take 20 mg by mouth nightly.  labetalol (NORMODYNE) 100 mg tablet Take 100 mg by mouth two (2) times a day.  Indications: hypertension           Review of Symptoms:     General - No history or complaints of unexpected fever or chills  Head/Neck - No history or complaints of headache or dizziness  Cardiac - No history or complaints of chest pain, palpitations, or shortness of breath  Pulmonary - No history or complaints of shortness of breath or productive cough  Gastrointestinal - as noted above  Genitourinary - No history or complaints of hematuria/dysuria or renal lithiasis  Musculoskeletal - No history or complaints of joint  muscular weakness  Hematologic - No history of any bleeding episodes  Neurologic - No history or complaints of  migraine headaches or neurologic symptoms    Objective:     Visit Vitals    /72 (BP 1 Location: Left arm, BP Patient Position: Sitting)    Pulse 76    Resp 16    Ht 5' 5\" (1.651 m)    Wt 82.6 kg (182 lb)    SpO2 100%    BMI 30.29 kg/m2        Physical Exam:    General:  alert, cooperative, no distress, appears stated age   Lungs:   clear to auscultation bilaterally   Heart:  Regular rate and rhythm   Abdomen:   abdomen is soft without significant tenderness, masses, organomegaly or guarding; Incisions: healing well, no significant drainage         Labs:     Recent Results (from the past 2016 hour(s))   HEPATIC FUNCTION PANEL    Collection Time: 12/13/17 12:04 PM   Result Value Ref Range    Protein, total 7.3 6.4 - 8.2 g/dL    Albumin 3.7 3.4 - 5.0 g/dL    Globulin 3.6 2.0 - 4.0 g/dL    A-G Ratio 1.0 0.8 - 1.7      Bilirubin, total 0.4 0.2 - 1.0 MG/DL    Bilirubin, direct 0.1 0.0 - 0.2 MG/DL    Alk. phosphatase 82 45 - 117 U/L    AST (SGOT) 20 15 - 37 U/L    ALT (SGPT) 28 13 - 56 U/L   METABOLIC PANEL, BASIC    Collection Time: 12/13/17 12:04 PM   Result Value Ref Range    Sodium 143 136 - 145 mmol/L    Potassium 4.3 3.5 - 5.5 mmol/L    Chloride 105 100 - 108 mmol/L    CO2 26 21 - 32 mmol/L    Anion gap 12 3.0 - 18 mmol/L    Glucose 151 (H) 74 - 99 mg/dL    BUN 8 7.0 - 18 MG/DL    Creatinine 0.64 0.6 - 1.3 MG/DL    BUN/Creatinine ratio 13 12 - 20      GFR est AA >60 >60 ml/min/1.73m2    GFR est non-AA >60 >60 ml/min/1.73m2    Calcium 9.8 8.5 - 10.1 MG/DL   CBC WITH AUTOMATED DIFF    Collection Time: 12/13/17 12:04 PM   Result Value Ref Range    WBC 10.5 4.6 - 13.2 K/uL    RBC 4.46 4.20 - 5.30 M/uL    HGB 13.0 12.0 - 16.0 g/dL    HCT 40.2 35.0 - 45.0 %    MCV 90.1 74.0 - 97.0 FL    MCH 29.1 24.0 - 34.0 PG    MCHC 32.3 31.0 - 37.0 g/dL    RDW 13.2 11.6 - 14.5 %    PLATELET 770 581 - 460 K/uL    MPV 12.1 (H) 9.2 - 11.8 FL    NEUTROPHILS 64 40 - 73 %    LYMPHOCYTES 27 21 - 52 %    MONOCYTES 6 3 - 10 %    EOSINOPHILS 3 0 - 5 %    BASOPHILS 0 0 - 2 %    ABS. NEUTROPHILS 6.7 1.8 - 8.0 K/UL    ABS. LYMPHOCYTES 2.8 0.9 - 3.6 K/UL    ABS. MONOCYTES 0.7 0.05 - 1.2 K/UL    ABS. EOSINOPHILS 0.3 0.0 - 0.4 K/UL    ABS.  BASOPHILS 0.0 0.0 - 0.06 K/UL    DF AUTOMATED     HCV AB W/REFLEX VERIFICATION    Collection Time: 12/13/17 12:04 PM   Result Value Ref Range    HCV Ab <0.1 0.0 - 0.9 s/co ratio   HEP A AB, TOTAL    Collection Time: 12/13/17 12:04 PM   Result Value Ref Range    Hep A Ab, total Positive (A) NEGATIVE     HEP B SURFACE AG    Collection Time: 12/13/17 12:04 PM   Result Value Ref Range    Hepatitis B surface Ag <0.10 <1.00 Index    Hep B surface Ag Interp. NEGATIVE  NEG     HEPATITIS B CORE AB, TOTAL    Collection Time: 12/13/17 12:04 PM   Result Value Ref Range    Hep B Core Ab, total NEGATIVE  NEGATIVE     HEP B SURFACE AB    Collection Time: 12/13/17 12:04 PM   Result Value Ref Range    Hepatitis B surface Ab <3.10 (L) >10.0 mIU/mL    Hep B surface Ab Interp. NEGATIVE  (A) POS      Hep B surface Ab comment        Samples with a  value of 10 mIU/mL or greater are considered positive (protective immunity) in accordance with the CDC guidelines. CERULOPLASMIN    Collection Time: 12/13/17 12:04 PM   Result Value Ref Range    Ceruloplasmin 34.9 19.0 - 39.0 mg/dL   HCV COMMENT    Collection Time: 12/13/17 12:04 PM   Result Value Ref Range    Comment Comment         Assessment:     1. History of Morbid obesity, status post  laparoscopic gastric bypass surgery. Doing well, no concerns. She conts Metformin 500 mg BID at the request of her endocrinologist HgbA1c was 6.7 with hopes of stopping all medication with more weight loss and a repeat HgbA1c of 5.5. She desires another 35 lb wt loss. She plans on making an appt to have a repeat sleep study to evaluate her sleep apnea status. She will get her labs done today. She is considering possible plastic surgery if she loses 100 lbs    Pt still requiring HTN and DM meds - has PCP visit soon to discuss D/C of HTN medication with more weight loss. Plan:     1. Remember to measure portions, continue low carbohydrate diet  2. Lab reviewed with patient and appropriate changes were made to vitamin regimen. 3. Remember vitamin supplements - The importance of such was discussed regarding the malabsorptive issues that the surgery creates  4. Exercise regimen appears adequate. 5. Attend support group  6. Follow-up in 3 month(s).   7. Total time spent with the patient 30 minutes.   8. The patient understands the plan of action

## 2018-02-12 NOTE — PROGRESS NOTES
Subjective:     Dede George  is a 40 y.o. female who presents for follow-up about 6 months following {BARIATRIC SURGERY [facility]}. She has lost a total of *** pounds since surgery. Body mass index is 30.29 kg/(m^2). . EBWL is (***). The patient presents today to assess their progress toward their goal of weight loss and to address any issues that may be present. Today the patient and I have reviewed their diet and how appropriate their food choices are. The following issues have been identified ***. .  Surgery related complication: ***       She reports {SYMPTOMS :34572} and denies {SYMPTOMS:24915}. The patients diet choices have been reviewed today and are as follows:      Breakfast: ***      Lunch: ***      Snack: ***      Supper :***    Patients pain score:***      The patient's exercise level: {exercise level:73961}. Changes in her medical history and medications have been reviewed.     Patient Active Problem List   Diagnosis Code    Morbid obesity ([facility] [address] E66.01    Diabetes mellitus ([address] E11.9    Hypertension I10    Hypercholesterolemia E78.00    Depression F32.9    Sleep apnea G47.30    Irregular menses N92.6    PCOS (polycystic ovarian syndrome) E28.2    Psoriasis L40.9    Intestinal malabsorption K90.9    S/P gastric bypass Z98.84     Past Medical History:   Diagnosis Date    Depression     Diabetes mellitus ([address]     Dx 2005 / HgbA1c 7.2017    Hypercholesterolemia     Hypertension     Irregular menses     Morbid obesity ([address]     Morbid obesity with body mass index of 40.0-49.9 (HCC)     PCOS (polycystic ovarian syndrome)     Psoriasis     Sleep apnea     no CPAP    Smoking     on chantix as of 2017     Past Surgical History:   Procedure Laterality Date    HX  SECTION      x2    HX KNEE ARTHROSCOPY Right     HX TONSILLECTOMY      HX TUBAL LIGATION       Current Outpatient Prescriptions   Medication Sig Dispense Refill    valsartan (DIOVAN) 160 mg tablet Take  by mouth daily.  SERTRALINE HCL (ZOLOFT PO) Take  by mouth.  metFORMIN (GLUCOPHAGE) 500 mg tablet Take  by mouth two (2) times daily (with meals).  multivitamin (ONE A DAY) tablet Take 1 Tab by mouth daily.  LACTOBACILLUS ACIDOPHILUS (PROBIOTIC PO) Take  by mouth.  CALCIUM CITRATE PO Take  by mouth.  cyanocobalamin (NASCOBAL) 500 mcg/spray spry by Nasal route.  b complex vitamins tablet Take 1 Tab by mouth daily.  BIOTIN PO Take  by mouth.  ursodiol (ACTIGALL) 300 mg capsule Take 1 Cap by mouth two (2) times a day. Indications: CHOLELITHIASIS PREVENTION 60 Cap 4    buPROPion (WELLBUTRIN) 75 mg tablet Take  by mouth two (2) times a day.  atorvastatin (LIPITOR) 20 mg tablet Take 20 mg by mouth nightly.  labetalol (NORMODYNE) 100 mg tablet Take 100 mg by mouth two (2) times a day. Indications: hypertension      valsartan-hydroCHLOROthiazide (DIOVAN-HCT) 160-12.5 mg per tablet Take 1 Tab by mouth nightly.           Review of Symptoms:       General - No history or complaints of unexpected fever or chills  Head/Neck - No history or complaints of headache or dizziness  Cardiac - No history or complaints of chest pain, palpitations, or shortness of breath  Pulmonary - No history or complaints of shortness of breath or productive cough  Gastrointestinal - as noted above  Genitourinary - No history or complaints of hematuria/dysuria or renal lithiasis  Musculoskeletal - No history or complaints of joint  muscular weakness  Hematologic - No history of any bleeding episodes  Neurologic - No history or complaints of  migraine headaches or neurologic symptoms                     Objective:     Visit Vitals    /72 (BP 1 Location: Left arm, BP Patient Position: Sitting)    Pulse 76    Resp 16    Ht 5' 5\" (1.651 m)    Wt 82.6 kg (182 lb)    SpO2 100%    BMI 30.29 kg/m2        Physical Exam:    General:  {general:83139::\"alert\",\"cooperative\",\"no distress\",\"appears stated age\"}   Lungs:   {Exam; lun::\"clear to auscultation bilaterally\"}   Heart:  {Exam; heart brief:02070}   Abdomen:   {Exam; abdomen:5259::\"abdomen is soft without significant tenderness, masses, organomegaly or guarding\"}; Incisions: {Exam; incision:30681}         Lab Results   Component Value Date/Time    WBC 10.5 2017 12:04 PM    HGB 13.0 2017 12:04 PM    HCT 40.2 2017 12:04 PM    PLATELET 452  12:04 PM    MCV 90.1 2017 12:04 PM     Lab Results   Component Value Date/Time    Sodium 143 2017 12:04 PM    Potassium 4.3 2017 12:04 PM    Chloride 105 2017 12:04 PM    CO2 26 2017 12:04 PM    Anion gap 12 2017 12:04 PM    Glucose 151 (H) 2017 12:04 PM    BUN 8 2017 12:04 PM    Creatinine 0.64 2017 12:04 PM    BUN/Creatinine ratio 13 2017 12:04 PM    GFR est AA >60 2017 12:04 PM    GFR est non-AA >60 2017 12:04 PM    Calcium 9.8 2017 12:04 PM    Bilirubin, total 0.4 2017 12:04 PM    AST (SGOT) 20 2017 12:04 PM    Alk. phosphatase 82 2017 12:04 PM    Protein, total 7.3 2017 12:04 PM    Albumin 3.7 2017 12:04 PM    Globulin 3.6 2017 12:04 PM    A-G Ratio 1.0 2017 12:04 PM    ALT (SGPT) 28 2017 12:04 PM     No results found for: IRON, FE, TIBC, IBCT, PSAT, FERR  No results found for: FOL, RBCF  No results found for: VITD3, Gwendlyn Plate, XQVID, VD3RIA          Assessment:     1. History of Morbid obesity, status post  {BARIATRIC SURGERY TGKGN:02615}. {doing well postop:71756::\"The patient ***\"}. Plan:     1. Remember to measure portions, continue low carbohydrate diet  2. Continue to concentrate on protein intake meeting daily requirements  3. Remember vitamin supplements. The importance of such was discussed regarding the malabsorptive issues that the surgery creates.   4. Exercise regimen appears to be: ***  5. Try and attend support group if feasible. 6. Follow-up {follow up:41815}. 7. Lab reviewed and appropriate changes made. 8. Total time spent with the patient 30 minutes.

## 2018-02-12 NOTE — PATIENT INSTRUCTIONS

## 2018-02-14 LAB — VIT B1 BLD-SCNC: 226.3 NMOL/L (ref 66.5–200)

## 2018-05-15 ENCOUNTER — OFFICE VISIT (OUTPATIENT)
Dept: SURGERY | Age: 43
End: 2018-05-15

## 2018-05-15 VITALS
OXYGEN SATURATION: 100 % | DIASTOLIC BLOOD PRESSURE: 74 MMHG | WEIGHT: 177.4 LBS | TEMPERATURE: 98 F | SYSTOLIC BLOOD PRESSURE: 120 MMHG | BODY MASS INDEX: 29.56 KG/M2 | HEIGHT: 65 IN | HEART RATE: 70 BPM

## 2018-05-15 DIAGNOSIS — Z98.84 S/P GASTRIC BYPASS: ICD-10-CM

## 2018-05-15 DIAGNOSIS — K90.9 INTESTINAL MALABSORPTION, UNSPECIFIED TYPE: Primary | ICD-10-CM

## 2018-05-15 NOTE — MR AVS SNAPSHOT
[address] 
[phone] Patient: Juno Lawrence MRN: [id number] YVQ:0[birth year] Visit Information Date & Time Provider Department Dept. Phone Encounter #  
 5/15/2018  9:30 AM Trini Mcdonough, [address] [facility] Ellynrosariokezia Johnson [id number] Follow-up Instructions Return in about 3 months (around 8/15/2018). Upcoming Health Maintenance Date Due  
 LIPID PANEL Q1 1976 FOOT EXAM Q1 9/9/1986 MICROALBUMIN Q1 9/9/1986 EYE EXAM RETINAL OR DILATED Q1 9/9/1986 Pneumococcal 19-64 Medium Risk (1 of 1 - PPSV23) 9/9/1995 DTaP/Tdap/Td series (1 - Tdap) 9/9/1997 PAP AKA CERVICAL CYTOLOGY 9/9/1997 HEMOGLOBIN A1C Q6M 2/7/2018 Influenza Age 5 to Adult 8/1/2018 Allergies as of 5/15/2018  Review Complete On: 5/15/2018 By: TALISHA Bolton No Known Allergies Current Immunizations  Never Reviewed No immunizations on file. Not reviewed this visit Vitals BP Pulse Temp Height(growth percentile) Weight(growth percentile) SpO2  
 120/74 (BP 1 Location: Left arm, BP Patient Position: Sitting) 70 98 °F (36.7 °C) (Temporal) 5' 5\" (1.651 m) 177 lb 6.4 oz (80.5 kg) 100% BMI OB Status Smoking Status 29.52 kg/m2 Having regular periods Former Smoker BMI and BSA Data Body Mass Index Body Surface Area  
 29.52 kg/m 2 1.92 m 2 Preferred Pharmacy Pharmacy Name Phone [phone], [address] [phone] Your Updated Medication List  
  
   
This list is accurate as of 5/15/18 10:15 AM.  Always use your most recent med list.  
  
  
  
  
 atorvastatin 20 mg tablet Commonly known as:  LIPITOR Take 20 mg by mouth nightly. b complex vitamins tablet Take 1 Tab by mouth daily. BIOTIN PO Take  by mouth.   
  
 CALCIUM CITRATE PO  
 Take  by mouth. labetalol 100 mg tablet Commonly known as:  Rachael Fuller Take 100 mg by mouth two (2) times a day. Indications: hypertension  
  
 metFORMIN 500 mg tablet Commonly known as:  GLUCOPHAGE Take  by mouth two (2) times daily (with meals). multivitamin tablet Commonly known as:  ONE A DAY Take 1 Tab by mouth daily. NASCOBAL 500 mcg/spray Spry Generic drug:  cyanocobalamin  
by Nasal route. PAXIL PO Take  by mouth. PROBIOTIC PO Take  by mouth.  
  
 valsartan 160 mg tablet Commonly known as:  DIOVAN Take  by mouth daily. WELLBUTRIN 75 mg tablet Generic drug:  buPROPion Take  by mouth two (2) times a day. Follow-up Instructions Return in about 3 months (around 8/15/2018). Patient Instructions Patient Instructions 1. Remember hydration goals - minimum of 64 ounces of liquids per day (dehydration is the number one reason for hospital readmission). 2. Continue to monitor carbohydrate and protein intake you need a minimum of  Grams of protein daily- remember to keep your total carbohydrates to 50 grams or less per day for best results. 3. Continue to work towards exercise goals - 60-90 minutes, 5 times a week minimum of deliberate, aerobic exercise is the ultimate goal with strength training 2 times each week. Refer to [employer] for  information. 4. Remember to take vitamins as directed in your handbook. 5. Attend support group the 2nd Thursday of each month. 6. Constipation: Milk of Magnesia is for immediate relief. Miralax is to be used every day if constipation is a chronic problem. 7. Diarrhea: patients will occasionally develop lactose intolerance after surgery. Check to see if your protein shake has whey in it.   If it does try one that does not have whey and stop all yogurts, cheeses and milks to see if the diarrhea goes away. 8. Call us at [phone] or email us through [email or web address]" with questions,     concerns or worsening of condition, we have someone on call 24 hours a day. If you are unable to reach our office, you are to go to your Primary Care Physician or the Emergency Department. Supplement Resource Guide Importance of Protein:  
Maintains lean body mass, produces antibodies to fight off infections, heals wounds, minimizes hair loss, helps to give you energy, helps with satiety, and keeping you full between meals. Importance of Calcium: 
Needed for healthy bones and teeth, normal blood clotting, and nervous system functioning, higher risk of osteoporosis and bone disease with non-compliance. Importance of Multivitamins: Many functions. Supply you with extra nutrients that you may be missing from food. May lead to iron deficiency anemia, weakness, fatigue, and many other symptoms with non-compliance. Importance of B Vitamins: 
Important for red blood cell formation, metabolism, energy, and helps to maintain a healthy nervous system. Protein Supplement Find one you like now. Use immediately after surgery. Look for: 
35-50g protein each day from your protein supplement once you reach the progression diet. 0-3 g fat per serving 0-3 g sugar per serving Protein drinks should be split in separate dosages. Recommend: Lifelong 1 year + Calcium Supplement:  
 
Start taking within a month after surgery. Look for: Calcium Citrate Plus D (1500 mg per day) Recommend: Citracal 
 
 . Avoid chocolate chewable calcium. Can use chewable bariatric or [employer] brand or similar chewable. The body cannot absorb more than 500-600 mg of calcium at a time. Take for Life Multi-vitamin Supplement:   
 
Start immediately after surgery: any complete chewable, such as: [place]s Complete chewables. Avoid Bennett sours or gummies. They lack iron and other important nutrients and also have added sugar. Continue with chewable vitamin or change to adult complete multivitamin one month after surgery. Menstruating women can take a prenatal vitamin. Make sure has at least 18 mg iron and 287-623 mcg folic acid Vitamin B12, B Complex Vitamin, and Biotin Start taking within a month after surgery. Vitamin B12:  1000 mcg of Vitamin B12 three times weekly Must take sublingually (meaning you take it under your tongue) or in a liquid drop form for easy absorption. B Complex Vitamin: Take a pill or liquid drop form once daily. Biotin: This vitamin can help prevent hair loss. Recommend 5mg  
(5000 mcg) a day Biotin is Optional  
 
 
 
 
 
 
  
Introducing \[facility]\"" & HEALTH SERVICES! Dear Courtney Delgado: Thank you for requesting a [employer] account. Our records indicate that you already have an active [employer] account. You can access your account anytime at [email or web address] Did you know that you can access your hospital and ER discharge instructions at any time in [employer]? You can also review all of your test results from your hospital stay or ER visit. Additional Information If you have questions, please visit the Frequently Asked Questions section of the [employer] website at [email or web address] Remember, [employer] is NOT to be used for urgent needs. For medical emergencies, dial 911. Now available from your iPhone and Android! Please provide this summary of care documentation to your next provider. Your primary care clinician is listed as Jarred Farrell. If you have any questions after today's visit, please call [phone].

## 2018-05-15 NOTE — PROGRESS NOTES
Subjective:      Cira Perez is a 40 y.o. female is now 9 months status post laparoscopic gastric bypass surgery. Doing well overall. She has lost a total of 70 pounds since surgery. Body mass index is 29.52 kg/(m^2). Has lost 62% of EBW. Currently on a solid food diet without difficulty, reports no issues and denies vomiting and abdominal pain. Taking in 60-80oz water daily. Sources of protein include chicken, yogurt, shakes. She is stuck in a food rut. Has been eating pineapple, grapes, carrots, broccoli, beans, lettuce. 30 min of activity 4 days a week, including walking. Bowel movements are regular. The patient is not having any pain. . The patient is compliant with multivitamins, calcium, Vit D and B12 supplements.      Weight Loss Metrics 5/15/2018 2/12/2018 12/13/2017 12/11/2017 10/11/2017 9/12/2017 8/29/2017   Today's Wt 177 lb 6.4 oz 182 lb 198 lb 6.4 oz 199 lb 12.8 oz 217 lb 227 lb 8 oz 234 lb 12.8 oz   BMI 29.52 kg/m2 30.29 kg/m2 33.02 kg/m2 33.25 kg/m2 36.11 kg/m2 37.86 kg/m2 39.07 kg/m2          Comorbidities:    Hypertension: improved, has been having lightheadedness upon standing  Diabetes: improved, metformin dose has been cut back   Obstructive Sleep Apnea: improved, not using CPAP  Hyperlipidemia: unchanged   Stress Urinary Incontinence: not applicable  Gastroesophageal Reflux: not applicable  Weight related arthropathy:not applicable     Patient Active Problem List   Diagnosis Code    Morbid obesity ([facility] [address] E66.01    Diabetes mellitus ([facility] [address] E11.9    Hypertension I10    Hypercholesterolemia E78.00    Depression F32.9    Sleep apnea G47.30    Irregular menses N92.6    PCOS (polycystic ovarian syndrome) E28.2    Psoriasis L40.9    Intestinal malabsorption K90.9    S/P gastric bypass Z98.84        Past Medical History:   Diagnosis Date    Depression     Diabetes mellitus ([facility] [address]     Dx 2005 / HgbA1c 7.1 Jan 2017    Hypercholesterolemia     Hypertension 2011    Irregular menses     Morbid obesity (HCC)     Morbid obesity with body mass index of 40.0-49.9 (HCC)     PCOS (polycystic ovarian syndrome)     Psoriasis     Sleep apnea     no CPAP    Smoking     on chantix as of 2017       Past Surgical History:   Procedure Laterality Date    HX  SECTION      x2    HX KNEE ARTHROSCOPY Right     HX TONSILLECTOMY      HX TUBAL LIGATION         Current Outpatient Prescriptions   Medication Sig Dispense Refill    paroxetine HCl (PAXIL PO) Take  by mouth.  valsartan (DIOVAN) 160 mg tablet Take  by mouth daily.  metFORMIN (GLUCOPHAGE) 500 mg tablet Take  by mouth two (2) times daily (with meals).  multivitamin (ONE A DAY) tablet Take 1 Tab by mouth daily.  LACTOBACILLUS ACIDOPHILUS (PROBIOTIC PO) Take  by mouth.  CALCIUM CITRATE PO Take  by mouth.  cyanocobalamin (NASCOBAL) 500 mcg/spray spry by Nasal route.  b complex vitamins tablet Take 1 Tab by mouth daily.  BIOTIN PO Take  by mouth.  buPROPion (WELLBUTRIN) 75 mg tablet Take  by mouth two (2) times a day.  atorvastatin (LIPITOR) 20 mg tablet Take 20 mg by mouth nightly.  labetalol (NORMODYNE) 100 mg tablet Take 100 mg by mouth two (2) times a day. Indications: hypertension      ursodiol (ACTIGALL) 300 mg capsule Take 1 Cap by mouth two (2) times a day.  Indications: CHOLELITHIASIS PREVENTION 60 Cap 4       No Known Allergies    Review of Systems:  General - No history or complaints of unexpected fever or chills  Head/Neck - No history or complaints of headache or dizziness  Cardiac - No history or complaints of chest pain, palpitations, or shortness of breath  Pulmonary - No history or complaints of shortness of breath or productive cough  Gastrointestinal - as noted above  Genitourinary - No history or complaints of hematuria/dysuria or renal lithiasis  Musculoskeletal - No history or complaints of joint  muscular weakness  Hematologic - No history of any bleeding episodes  Neurologic - No history or complaints of  migraine headaches or neurologic symptoms    Objective:     Visit Vitals    /74 (BP 1 Location: Left arm, BP Patient Position: Sitting)    Pulse 70    Temp 98 °F (36.7 °C) (Temporal)    Ht 5' 5\" (1.651 m)    Wt 80.5 kg (177 lb 6.4 oz)    SpO2 100%    BMI 29.52 kg/m2       General:  alert, cooperative, no distress, appears stated age   Chest: lungs clear to auscultation, no rhonchi, rales or wheezes, no accessory muscle use   Cor:   Regular rate and rhythm or without murmur or extra heart sounds   Abdomen: soft, bowel sounds active, non-tender, no masses or organomegaly   Incisions:   healing well, no drainage, no erythema, no hernia, no seroma, no swelling, no dehiscence, incision well approximated       Assessment:   History of Morbid obesity, status post laparoscopic gastric bypass surgery. Doing well postoperatively. HTN - follow up with PCP  AUBREY - repeat sleep study  DM - continue current regimen  Hypercholesterolemia - follow up with PCP for retesting  Plan:     1. Increase activity to the goal of 30 minutes daily  2. Discussed patients weight loss goals and dietary choices in relation to goals. 3. Reminded to measure portions, continue high protein, low carbohydrate diet. Reminded to eat regularly, to eat slowly & not to drink with meals. 4. Continue vitamin supplementation  5. Continue current medications and follow up with PCP for management of regimen. 6. Continue cardio exercise and add resistance exercises. 60-90 minutes of aerobic activity 5 days a week and strength training 2 days each week. 7. Encouraged to attend support group   8. I have discussed this plan with patient and they verbalized understanding  9. Follow up in 3 months or sooner if patient has questions, concerns or worsening of condition, if unable to reach our office, patient should report to the ED.     10. Ms. Marlen Corley has a reminder for a \"due or due soon\" health maintenance. I have asked that she contact her primary care provider for a follow-up on this health maintenance.

## 2018-05-15 NOTE — PATIENT INSTRUCTIONS
Patient Instructions      1. Remember hydration goals - minimum of 64 ounces of liquids per day (dehydration is the number one reason for hospital readmission). 2. Continue to monitor carbohydrate and protein intake you need a minimum of  Grams of protein daily- remember to keep your total carbohydrates to 50 grams or less per day for best results. 3. Continue to work towards exercise goals - 60-90 minutes, 5 times a week minimum of deliberate, aerobic exercise is the ultimate goal with strength training 2 times each week. Refer to [employer] for  information. 4. Remember to take vitamins as directed in your handbook. 5. Attend support group the 2nd Thursday of each month. 6. Constipation: Milk of Magnesia is for immediate relief. Miralax is to be used every day if constipation is a chronic problem. 7. Diarrhea: patients will occasionally develop lactose intolerance after surgery. Check to see if your protein shake has whey in it. If it does try one that does not have whey and stop all yogurts, cheeses and milks to see if the diarrhea goes away. 8. Call us at [phone] or email us through [email or web address]" with questions,     concerns or worsening of condition, we have someone on call 24 hours a day. If you are unable to reach our office, you are to go to your Primary Care Physician or the Emergency Department. Supplement Resource Guide    Importance of Protein:   Maintains lean body mass, produces antibodies to fight off infections, heals wounds, minimizes hair loss, helps to give you energy, helps with satiety, and keeping you full between meals. Importance of Calcium:  Needed for healthy bones and teeth, normal blood clotting, and nervous system functioning, higher risk of osteoporosis and bone disease with non-compliance. Importance of Multivitamins: Many functions.   Supply you with extra nutrients that you may be missing from food. May lead to iron deficiency anemia, weakness, fatigue, and many other symptoms with non-compliance. Importance of B Vitamins:  Important for red blood cell formation, metabolism, energy, and helps to maintain a healthy nervous system. Protein Supplement  Find one you like now. Use immediately after surgery. Look for:  35-50g protein each day from your protein supplement once you reach the progression diet. 0-3 g fat per serving  0-3 g sugar per serving    Protein drinks should be split in separate dosages. Recommend: Lifelong  1 year + Calcium Supplement:     Start taking within a month after surgery. Look for: Calcium Citrate Plus D (1500 mg per day)  Recommend: Citracal     .            Avoid chocolate chewable calcium. Can use chewable bariatric or [employer] brand or similar chewable. The body cannot absorb more than 500-600 mg of calcium at a time. Take for Life Multi-vitamin Supplement:      Start immediately after surgery: any complete chewable, such as: [place]s Complete chewables. Avoid [place] sours or gummies. They lack iron and other important nutrients and also have added sugar. Continue with chewable vitamin or change to adult complete multivitamin one month after surgery. Menstruating women can take a prenatal vitamin. Make sure has at least 18 mg iron and 773-469 mcg folic acid   Vitamin M06, B Complex Vitamin, and Biotin  Start taking within a month after surgery. Vitamin B12:  1000 mcg of Vitamin B12 three times weekly    Must take sublingually (meaning you take it under your tongue) or in a liquid drop form for easy absorption. B Complex Vitamin: Take a pill or liquid drop form once daily. Biotin: This vitamin can help prevent hair loss.     Recommend 5mg   (5000 mcg) a day  Biotin is Optional

## 2018-07-23 RX ORDER — CYANOCOBALAMIN 500 UG/1
SPRAY NASAL
Qty: 1.3 ML | Refills: 11 | Status: SHIPPED | OUTPATIENT
Start: 2018-07-23

## 2018-07-30 ENCOUNTER — HOSPITAL ENCOUNTER (EMERGENCY)
Age: 43
Discharge: HOME | End: 2018-07-31
Attending: INTERNAL MEDICINE
Payer: COMMERCIAL

## 2018-07-30 ENCOUNTER — APPOINTMENT (OUTPATIENT)
Dept: CT IMAGING | Age: 43
End: 2018-07-30
Attending: INTERNAL MEDICINE
Payer: COMMERCIAL

## 2018-07-30 ENCOUNTER — APPOINTMENT (OUTPATIENT)
Dept: GENERAL RADIOLOGY | Age: 43
End: 2018-07-30
Attending: INTERNAL MEDICINE
Payer: COMMERCIAL

## 2018-07-30 VITALS
BODY MASS INDEX: 29.16 KG/M2 | SYSTOLIC BLOOD PRESSURE: 133 MMHG | TEMPERATURE: 98.3 F | DIASTOLIC BLOOD PRESSURE: 78 MMHG | OXYGEN SATURATION: 100 % | RESPIRATION RATE: 16 BRPM | WEIGHT: 175 LBS | HEIGHT: 65 IN | HEART RATE: 72 BPM

## 2018-07-30 DIAGNOSIS — R10.12 ABDOMINAL PAIN, LUQ (LEFT UPPER QUADRANT): Primary | ICD-10-CM

## 2018-07-30 DIAGNOSIS — R10.13 ABDOMINAL PAIN, EPIGASTRIC: ICD-10-CM

## 2018-07-30 DIAGNOSIS — N39.0 URINARY TRACT INFECTION WITHOUT HEMATURIA, SITE UNSPECIFIED: ICD-10-CM

## 2018-07-30 DIAGNOSIS — R79.89 ABNORMAL LFTS: ICD-10-CM

## 2018-07-30 LAB
ALBUMIN SERPL-MCNC: 4 G/DL (ref 3.4–5)
ALBUMIN/GLOB SERPL: 1 {RATIO} (ref 0.8–1.7)
ALP SERPL-CCNC: 66 U/L (ref 45–117)
ALT SERPL-CCNC: 103 U/L (ref 13–56)
AMORPH CRY URNS QL MICRO: ABNORMAL
ANION GAP SERPL CALC-SCNC: 10 MMOL/L (ref 3–18)
APPEARANCE UR: ABNORMAL
AST SERPL-CCNC: 63 U/L (ref 15–37)
BACTERIA URNS QL MICRO: ABNORMAL /HPF
BASOPHILS # BLD: 0 K/UL (ref 0–0.1)
BASOPHILS NFR BLD: 0 % (ref 0–2)
BILIRUB SERPL-MCNC: 0.4 MG/DL (ref 0.2–1)
BILIRUB UR QL: NEGATIVE
BUN SERPL-MCNC: 12 MG/DL (ref 7–18)
BUN/CREAT SERPL: 17 (ref 12–20)
CALCIUM SERPL-MCNC: 9.5 MG/DL (ref 8.5–10.1)
CHLORIDE SERPL-SCNC: 101 MMOL/L (ref 100–108)
CK MB CFR SERPL CALC: 1.2 % (ref 0–4)
CK MB SERPL-MCNC: 1.3 NG/ML (ref 5–25)
CK SERPL-CCNC: 105 U/L (ref 26–192)
CO2 SERPL-SCNC: 25 MMOL/L (ref 21–32)
COLOR UR: YELLOW
CREAT SERPL-MCNC: 0.72 MG/DL (ref 0.6–1.3)
DIFFERENTIAL METHOD BLD: NORMAL
EOSINOPHIL # BLD: 0.2 K/UL (ref 0–0.4)
EOSINOPHIL NFR BLD: 2 % (ref 0–5)
EPITH CASTS URNS QL MICRO: ABNORMAL /LPF (ref 0–5)
ERYTHROCYTE [DISTWIDTH] IN BLOOD BY AUTOMATED COUNT: 12.6 % (ref 11.6–14.5)
GLOBULIN SER CALC-MCNC: 3.9 G/DL (ref 2–4)
GLUCOSE SERPL-MCNC: 129 MG/DL (ref 74–99)
GLUCOSE UR STRIP.AUTO-MCNC: NEGATIVE MG/DL
HCG UR QL: NEGATIVE
HCT VFR BLD AUTO: 39.2 % (ref 35–45)
HGB BLD-MCNC: 13.3 G/DL (ref 12–16)
HGB UR QL STRIP: NEGATIVE
KETONES UR QL STRIP.AUTO: ABNORMAL MG/DL
LEUKOCYTE ESTERASE UR QL STRIP.AUTO: ABNORMAL
LIPASE SERPL-CCNC: 105 U/L (ref 73–393)
LYMPHOCYTES # BLD: 2.7 K/UL (ref 0.9–3.6)
LYMPHOCYTES NFR BLD: 23 % (ref 21–52)
MCH RBC QN AUTO: 30.8 PG (ref 24–34)
MCHC RBC AUTO-ENTMCNC: 33.9 G/DL (ref 31–37)
MCV RBC AUTO: 90.7 FL (ref 74–97)
MONOCYTES # BLD: 0.8 K/UL (ref 0.05–1.2)
MONOCYTES NFR BLD: 7 % (ref 3–10)
NEUTS SEG # BLD: 7.8 K/UL (ref 1.8–8)
NEUTS SEG NFR BLD: 68 % (ref 40–73)
NITRITE UR QL STRIP.AUTO: NEGATIVE
PH UR STRIP: 8.5 [PH] (ref 5–8)
PLATELET # BLD AUTO: 316 K/UL (ref 135–420)
PMV BLD AUTO: 10.4 FL (ref 9.2–11.8)
POTASSIUM SERPL-SCNC: 3.8 MMOL/L (ref 3.5–5.5)
PROT SERPL-MCNC: 7.9 G/DL (ref 6.4–8.2)
PROT UR STRIP-MCNC: NEGATIVE MG/DL
RBC # BLD AUTO: 4.32 M/UL (ref 4.2–5.3)
RBC #/AREA URNS HPF: NEGATIVE /HPF (ref 0–5)
SODIUM SERPL-SCNC: 136 MMOL/L (ref 136–145)
SP GR UR REFRACTOMETRY: 1.02 (ref 1–1.03)
TROPONIN I SERPL-MCNC: <0.02 NG/ML (ref 0–0.06)
UROBILINOGEN UR QL STRIP.AUTO: 1 EU/DL (ref 0.2–1)
WBC # BLD AUTO: 11.5 K/UL (ref 4.6–13.2)
WBC URNS QL MICRO: ABNORMAL /HPF (ref 0–5)

## 2018-07-30 PROCEDURE — 81025 URINE PREGNANCY TEST: CPT | Performed by: INTERNAL MEDICINE

## 2018-07-30 PROCEDURE — 96365 THER/PROPH/DIAG IV INF INIT: CPT

## 2018-07-30 PROCEDURE — 93005 ELECTROCARDIOGRAM TRACING: CPT

## 2018-07-30 PROCEDURE — 82550 ASSAY OF CK (CPK): CPT | Performed by: INTERNAL MEDICINE

## 2018-07-30 PROCEDURE — 85025 COMPLETE CBC W/AUTO DIFF WBC: CPT | Performed by: INTERNAL MEDICINE

## 2018-07-30 PROCEDURE — 96375 TX/PRO/DX INJ NEW DRUG ADDON: CPT

## 2018-07-30 PROCEDURE — 87086 URINE CULTURE/COLONY COUNT: CPT | Performed by: INTERNAL MEDICINE

## 2018-07-30 PROCEDURE — 83690 ASSAY OF LIPASE: CPT | Performed by: INTERNAL MEDICINE

## 2018-07-30 PROCEDURE — 74011000250 HC RX REV CODE- 250: Performed by: INTERNAL MEDICINE

## 2018-07-30 PROCEDURE — 74011250636 HC RX REV CODE- 250/636: Performed by: INTERNAL MEDICINE

## 2018-07-30 PROCEDURE — 99284 EMERGENCY DEPT VISIT MOD MDM: CPT

## 2018-07-30 PROCEDURE — 71045 X-RAY EXAM CHEST 1 VIEW: CPT

## 2018-07-30 PROCEDURE — 81001 URINALYSIS AUTO W/SCOPE: CPT | Performed by: INTERNAL MEDICINE

## 2018-07-30 PROCEDURE — 80053 COMPREHEN METABOLIC PANEL: CPT | Performed by: INTERNAL MEDICINE

## 2018-07-30 RX ORDER — FAMOTIDINE 10 MG/ML
20 INJECTION INTRAVENOUS
Status: COMPLETED | OUTPATIENT
Start: 2018-07-30 | End: 2018-07-30

## 2018-07-30 RX ORDER — ACETAMINOPHEN 10 MG/ML
1000 INJECTION, SOLUTION INTRAVENOUS ONCE
Status: COMPLETED | OUTPATIENT
Start: 2018-07-30 | End: 2018-07-30

## 2018-07-30 RX ORDER — ONDANSETRON 2 MG/ML
4 INJECTION INTRAMUSCULAR; INTRAVENOUS
Status: COMPLETED | OUTPATIENT
Start: 2018-07-30 | End: 2018-07-30

## 2018-07-30 RX ORDER — CEPHALEXIN 500 MG/1
500 CAPSULE ORAL 3 TIMES DAILY
Qty: 21 CAP | Refills: 0 | Status: SHIPPED | OUTPATIENT
Start: 2018-07-30 | End: 2018-08-06

## 2018-07-30 RX ADMIN — ONDANSETRON 4 MG: 2 INJECTION INTRAMUSCULAR; INTRAVENOUS at 22:06

## 2018-07-30 RX ADMIN — WATER 1 G: 1 INJECTION INTRAMUSCULAR; INTRAVENOUS; SUBCUTANEOUS at 22:11

## 2018-07-30 RX ADMIN — ACETAMINOPHEN 1000 MG: 10 INJECTION, SOLUTION INTRAVENOUS at 22:16

## 2018-07-30 RX ADMIN — FAMOTIDINE 20 MG: 10 INJECTION INTRAVENOUS at 22:08

## 2018-07-31 NOTE — ED NOTES
Pt states that she is feeling better. Dr. Victorino Marie updated. Shift report given to Jesenia Verduzco RN.

## 2018-07-31 NOTE — DISCHARGE INSTRUCTIONS
Liver Function Tests: About These Tests  What is it? Liver function tests check how well your liver is working. Some tests measure the amount of certain enzymes in your blood to check whether the liver is damaged or inflamed. Other tests measure how well the liver can make important proteins or clear waste products from the body. Your doctor will use the test results to help look for certain conditions, such as hepatitis, cirrhosis, or gallbladder problems. Test results that are not normal do not always mean there is a problem with your liver. Your doctor can determine if there is a problem based on your results. The tests may include:  · Alkaline phosphatase (ALP). This test measures the amount of the enzyme ALP in your blood. · Total protein. A total serum protein test measures the amounts of two major groups of proteins in your blood (albumin and globulin) and the total amount of protein. · Bilirubin. This test measures the amount of bilirubin in your blood. When bilirubin levels are high, the skin and whites of the eyes may appear yellow (jaundice). This may be caused by liver disease. · Aspartate aminotransferase (AST) and alanine aminotransferase (ALT). These tests measure the amount of the enzymes AST and ALT in your blood. (Aminotransferase is also known as a transaminase. High levels may be called transaminitis.)  Why is this test done? Liver function tests check how well your liver is working. Some tests help show whether your liver is damaged or inflamed. Your doctor may order liver function tests if you have symptoms of liver disease. These tests also may be done to see how well a treatment for liver disease is working. How can you prepare for the test?  In general, you do not need to prepare before having these tests. Your doctor may give you some specific instructions to prepare. What happens during the test?  A health professional takes a sample of your blood.   What happens after the test?  You will probably be able to go home right away. When should you call for help? Watch closely for changes in your health, and be sure to contact your doctor if you have any problems. Follow-up care is a key part of your treatment and safety. Be sure to make and go to all appointments, and call your doctor if you are having problems. It's also a good idea to keep a list of the medicines you take. Ask your doctor when you can expect to have your test results. Where can you learn more? Go to http://mariano-ashlee.info/. Enter B669 in the search box to learn more about \"Liver Function Tests: About These Tests. \"  Current as of: October 9, 2017  Content Version: 11.7  © 0070-9109 [employer]. Care instructions adapted under license by [employer] (which disclaims liability or warranty for this information). If you have questions about a medical condition or this instruction, always ask your healthcare professional. [place] [address] any warranty or liability for your use of this information. Urinary Tract Infection in Women: Care Instructions  Your Care Instructions    A urinary tract infection, or UTI, is a general term for an infection anywhere between the kidneys and the urethra (where urine comes out). Most UTIs are bladder infections. They often cause pain or burning when you urinate. UTIs are caused by bacteria and can be cured with antibiotics. Be sure to complete your treatment so that the infection goes away. Follow-up care is a key part of your treatment and safety. Be sure to make and go to all appointments, and call your doctor if you are having problems. It's also a good idea to know your test results and keep a list of the medicines you take. How can you care for yourself at home? · Take your antibiotics as directed. Do not stop taking them just because you feel better.  You need to take the full course of antibiotics. · Drink extra water and other fluids for the next day or two. This may help wash out the bacteria that are causing the infection. (If you have kidney, heart, or liver disease and have to limit fluids, talk with your doctor before you increase your fluid intake.)  · Avoid drinks that are carbonated or have caffeine. They can irritate the bladder. · Urinate often. Try to empty your bladder each time. · To relieve pain, take a hot bath or lay a heating pad set on low over your lower belly or genital area. Never go to sleep with a heating pad in place. To prevent UTIs  · Drink plenty of water each day. This helps you urinate often, which clears bacteria from your system. (If you have kidney, heart, or liver disease and have to limit fluids, talk with your doctor before you increase your fluid intake.)  · Urinate when you need to. · Urinate right after you have sex. · Change sanitary pads often. · Avoid douches, bubble baths, feminine hygiene sprays, and other feminine hygiene products that have deodorants. · After going to the bathroom, wipe from front to back. When should you call for help? Call your doctor now or seek immediate medical care if:    · Symptoms such as fever, chills, nausea, or vomiting get worse or appear for the first time.     · You have new pain in your back just below your rib cage. This is called flank pain.     · There is new blood or pus in your urine.     · You have any problems with your antibiotic medicine.    Watch closely for changes in your health, and be sure to contact your doctor if:    · You are not getting better after taking an antibiotic for 2 days.     · Your symptoms go away but then come back. Where can you learn more? Go to http://mariano-ashele.info/. Enter B032 in the search box to learn more about \"Urinary Tract Infection in Women: Care Instructions. \"  Current as of:  May 12, 2017  Content Version: 11.7  © 4117-5398 [employer]. Care instructions adapted under license by [employer] (which disclaims liability or warranty for this information). If you have questions about a medical condition or this instruction, always ask your healthcare professional. [address] any warranty or liability for your use of this information. Abdominal Pain: Care Instructions  Your Care Instructions    Abdominal pain has many possible causes. Some aren't serious and get better on their own in a few days. Others need more testing and treatment. If your pain continues or gets worse, you need to be rechecked and may need more tests to find out what is wrong. You may need surgery to correct the problem. Don't ignore new symptoms, such as fever, nausea and vomiting, urination problems, pain that gets worse, and dizziness. These may be signs of a more serious problem. Your doctor may have recommended a follow-up visit in the next 8 to 12 hours. If you are not getting better, you may need more tests or treatment. The doctor has checked you carefully, but problems can develop later. If you notice any problems or new symptoms, get medical treatment right away. Follow-up care is a key part of your treatment and safety. Be sure to make and go to all appointments, and call your doctor if you are having problems. It's also a good idea to know your test results and keep a list of the medicines you take. How can you care for yourself at home? · Rest until you feel better. · To prevent dehydration, drink plenty of fluids, enough so that your urine is light yellow or clear like water. Choose water and other caffeine-free clear liquids until you feel better. If you have kidney, heart, or liver disease and have to limit fluids, talk with your doctor before you increase the amount of fluids you drink. · If your stomach is upset, eat mild foods, such as rice, dry toast or crackers, bananas, and applesauce.  Try eating several small meals instead of two or three large ones. · Wait until 48 hours after all symptoms have gone away before you have spicy foods, alcohol, and drinks that contain caffeine. · Do not eat foods that are high in fat. · Avoid anti-inflammatory medicines such as aspirin, ibuprofen (Advil, Motrin), and naproxen (Aleve). These can cause stomach upset. Talk to your doctor if you take daily aspirin for another health problem. When should you call for help? Call 911 anytime you think you may need emergency care. For example, call if:    · You passed out (lost consciousness).     · You pass maroon or very bloody stools.     · You vomit blood or what looks like coffee grounds.     · You have new, severe belly pain.    Call your doctor now or seek immediate medical care if:    · Your pain gets worse, especially if it becomes focused in one area of your belly.     · You have a new or higher fever.     · Your stools are black and look like tar, or they have streaks of blood.     · You have unexpected vaginal bleeding.     · You have symptoms of a urinary tract infection. These may include:  ¨ Pain when you urinate. ¨ Urinating more often than usual.  ¨ Blood in your urine.     · You are dizzy or lightheaded, or you feel like you may faint.    Watch closely for changes in your health, and be sure to contact your doctor if:    · You are not getting better after 1 day (24 hours). Where can you learn more? Go to http://mariano-ashlee.info/. Enter U089 in the search box to learn more about \"Abdominal Pain: Care Instructions. \"  Current as of: November 20, 2017  Content Version: 11.7  © 2825-1097 [employer]. Care instructions adapted under license by [employer] (which disclaims liability or warranty for this information).  If you have questions about a medical condition or this instruction, always ask your healthcare professional. Rhonda [place]rett any warranty or liability for your use of this information.

## 2018-07-31 NOTE — ED TRIAGE NOTES
Presented to ED to be evaluated for reported abdomen and back pain with onset this a.m. Patient reports pain as documented. Patient denies any known injury. Patient states no other complaints at this time. Hx of gastric bypass x 1 year ago. Sepsis Screening completed (  )Patient meets SIRS criteria. ( x )Patient does not meet SIRS criteria. SIRS Criteria is achieved when two or more of the following are present  Temperature < 96.8°F (36°C) or > 100.9°F (38.3°C)  Heart Rate > 90 beats per minute  Respiratory Rate > 20 breaths per minute  WBC count > 12,000 or <4,000 or > 10% bands

## 2018-07-31 NOTE — ED PROVIDER NOTES
EMERGENCY DEPARTMENT HISTORY AND PHYSICAL EXAM 
 
Date: 7/30/2018 Patient Name: Brandie Davidson History of Presenting Illness Chief Complaint Patient presents with  Abdominal Pain History Provided By: Patient Chief Complaint: Upper abdominal pain Duration: Since this AM  
Timing:  Constant Location: Upper abdomen Severity: 8 out of 10 Modifying Factors: Worsened with sitting and standing. Notes no relief with Tylenol, last dose 1.5 hours ago Associated Symptoms: nausea, decreased urine, and back pain Additional History (Context):  
9:07 PM 
Brandie Davidson is a 40 y.o. female with PMHx of DM and PSHx of gastric bypass who presents to the emergency department C/O constant upper abdominal pain (rated 8/10), onset this AM. Worsened with sitting and standing. Notes no relief with Tylenol, last dose 1.5 hours ago. Associated sxs include nausea, decreased urine, and back pain. Last BM was yesterday. Pt denies vomiting, diarrhea, constipation, cough, congestion, CP, malodorous urine, or any other sxs or complaints. Pt defers imaging at this time. PCP: Valencia Duong MD 
 
Current Facility-Administered Medications Medication Dose Route Frequency Provider Last Rate Last Dose  iopamidol (ISOVUE 300) 61 % contrast injection 100 mL  100 mL IntraVENous RAD Jolie Philippe MD      
 
Current Outpatient Prescriptions Medication Sig Dispense Refill  cephALEXin (KEFLEX) 500 mg capsule Take 1 Cap by mouth three (3) times daily for 7 days. 21 Cap 0  paroxetine HCl (PAXIL PO) Take  by mouth.  valsartan (DIOVAN) 160 mg tablet Take  by mouth daily.  metFORMIN (GLUCOPHAGE) 500 mg tablet Take  by mouth two (2) times daily (with meals).  multivitamin (ONE A DAY) tablet Take 1 Tab by mouth daily.  LACTOBACILLUS ACIDOPHILUS (PROBIOTIC PO) Take  by mouth.  CALCIUM CITRATE PO Take  by mouth.  b complex vitamins tablet Take 1 Tab by mouth daily.     
 BIOTIN PO Take  by mouth.  buPROPion (WELLBUTRIN) 75 mg tablet Take  by mouth two (2) times a day.  atorvastatin (LIPITOR) 20 mg tablet Take 20 mg by mouth nightly.  labetalol (NORMODYNE) 100 mg tablet Take 100 mg by mouth two (2) times a day. Indications: hypertension  NASCOBAL 500 mcg/spray spry USE 1 SPRAY IN ONE NOSTRIL ONCE A WEEK AS DIRECTED 1.3 mL 11 Past History Past Medical History: 
Past Medical History:  
Diagnosis Date  Depression  Diabetes mellitus ([facility] Dx  / HgbA1c 7.2017  Hypercholesterolemia  Hypertension   Irregular menses  Morbid obesity ([facility] [address]  Morbid obesity with body mass index of 40.0-49.9 (HCC)  PCOS (polycystic ovarian syndrome)  Psoriasis  Sleep apnea   
 no CPAP  Smoking   
 on chantix as of 2017 Past Surgical History: 
Past Surgical History:  
Procedure Laterality Date  HX  SECTION    
 x2  
 HX KNEE ARTHROSCOPY Right  HX TONSILLECTOMY  HX TUBAL LIGATION Family History: 
Family History Problem Relation Age of Onset  Hypertension Father  Arthritis-osteo Father Social History: 
Social History Substance Use Topics  Smoking status: Former Smoker Years: 0.10 Types: Cigarettes Quit date: 2017  Smokeless tobacco: Never Used  Alcohol use No  
 
 
Allergies: 
No Known Allergies Review of Systems Review of Systems Respiratory: Negative for cough. Cardiovascular: Negative for chest pain. Gastrointestinal: Positive for abdominal pain (upper) and nausea. Negative for constipation, diarrhea and vomiting. Genitourinary: Positive for decreased urine volume. (-) malodorous urine Musculoskeletal: Positive for back pain. All other systems reviewed and are negative. Physical Exam  
 
Vitals:  
 18 2041 BP: 133/78 Pulse: 72 Resp: 16 Temp: 98.3 °F (36.8 °C) SpO2: 100% Weight: 79.4 kg (175 lb) Height: 5' 5\" (1.651 m) Physical Exam  
Constitutional: She is oriented to person, place, and time. She appears well-developed and well-nourished. HENT:  
Head: Normocephalic and atraumatic. Right Ear: External ear normal.  
Left Ear: External ear normal.  
Nose: Nose normal.  
Mouth/Throat: Oropharynx is clear and moist.  
Eyes: Conjunctivae and EOM are normal. Pupils are equal, round, and reactive to light. Right eye exhibits no discharge. Left eye exhibits no discharge. No scleral icterus. Neck: Normal range of motion. Neck supple. No JVD present. No tracheal deviation present. Cardiovascular: Normal rate, regular rhythm, normal heart sounds and intact distal pulses. Pulses: 
     Dorsalis pedis pulses are 2+ on the right side, and 2+ on the left side. Pulmonary/Chest: Effort normal and breath sounds normal.  
Abdominal: Soft. She exhibits no distension. Bowel sounds are increased. There is tenderness in the right upper quadrant, epigastric area and left upper quadrant. There is no rebound. No HSM. Bilateral upper quadrant and epigastric tenderness, no rebound, hyperactive bowel sounds. Headed laparoscopic surgical scars. Musculoskeletal: Normal range of motion. Neurological: She is alert and oriented to person, place, and time. She has normal reflexes. No focal motor weakness. Skin: Skin is warm and dry. No rash noted. Psychiatric: She has a normal mood and affect. Her behavior is normal.  
Nursing note and vitals reviewed. Diagnostic Study Results Labs - Recent Results (from the past 12 hour(s)) URINALYSIS W/ RFLX MICROSCOPIC Collection Time: 07/30/18  8:43 PM  
Result Value Ref Range Color YELLOW Appearance TURBID Specific gravity 1.022 1.005 - 1.030    
 pH (UA) 8.5 (H) 5.0 - 8.0 Protein NEGATIVE  NEG mg/dL Glucose NEGATIVE  NEG mg/dL Ketone TRACE (A) NEG mg/dL Bilirubin NEGATIVE  NEG Blood NEGATIVE  NEG  Urobilinogen 1.0 0.2 - 1.0 EU/dL Nitrites NEGATIVE  NEG Leukocyte Esterase LARGE (A) NEG    
HCG URINE, QL Collection Time: 07/30/18  8:43 PM  
Result Value Ref Range HCG urine, QL NEGATIVE  NEG    
URINE MICROSCOPIC ONLY Collection Time: 07/30/18  8:43 PM  
Result Value Ref Range WBC 20 to 30 0 - 5 /hpf  
 RBC NEGATIVE  0 - 5 /hpf Epithelial cells 1+ 0 - 5 /lpf Bacteria 2+ (A) NEG /hpf Amorphous Crystals 2+ (A) NEG  
CBC WITH AUTOMATED DIFF Collection Time: 07/30/18  9:50 PM  
Result Value Ref Range WBC 11.5 4.6 - 13.2 K/uL  
 RBC 4.32 4.20 - 5.30 M/uL  
 HGB 13.3 12.0 - 16.0 g/dL HCT 39.2 35.0 - 45.0 % MCV 90.7 74.0 - 97.0 FL  
 MCH 30.8 24.0 - 34.0 PG  
 MCHC 33.9 31.0 - 37.0 g/dL  
 RDW 12.6 11.6 - 14.5 % PLATELET 360 944 - 455 K/uL MPV 10.4 9.2 - 11.8 FL  
 NEUTROPHILS 68 40 - 73 % LYMPHOCYTES 23 21 - 52 % MONOCYTES 7 3 - 10 % EOSINOPHILS 2 0 - 5 % BASOPHILS 0 0 - 2 %  
 ABS. NEUTROPHILS 7.8 1.8 - 8.0 K/UL  
 ABS. LYMPHOCYTES 2.7 0.9 - 3.6 K/UL  
 ABS. MONOCYTES 0.8 0.05 - 1.2 K/UL  
 ABS. EOSINOPHILS 0.2 0.0 - 0.4 K/UL  
 ABS. BASOPHILS 0.0 0.0 - 0.1 K/UL  
 DF AUTOMATED METABOLIC PANEL, COMPREHENSIVE Collection Time: 07/30/18  9:50 PM  
Result Value Ref Range Sodium 136 136 - 145 mmol/L Potassium 3.8 3.5 - 5.5 mmol/L Chloride 101 100 - 108 mmol/L  
 CO2 25 21 - 32 mmol/L Anion gap 10 3.0 - 18 mmol/L Glucose 129 (H) 74 - 99 mg/dL BUN 12 7.0 - 18 MG/DL Creatinine 0.72 0.6 - 1.3 MG/DL  
 BUN/Creatinine ratio 17 12 - 20 GFR est AA >60 >60 ml/min/1.73m2 GFR est non-AA >60 >60 ml/min/1.73m2 Calcium 9.5 8.5 - 10.1 MG/DL Bilirubin, total 0.4 0.2 - 1.0 MG/DL  
 ALT (SGPT) 103 (H) 13 - 56 U/L  
 AST (SGOT) 63 (H) 15 - 37 U/L Alk. phosphatase 66 45 - 117 U/L Protein, total 7.9 6.4 - 8.2 g/dL Albumin 4.0 3.4 - 5.0 g/dL Globulin 3.9 2.0 - 4.0 g/dL A-G Ratio 1.0 0.8 - 1.7 LIPASE  Collection Time: 07/30/18  9:50 PM  
Result Value Ref Range Lipase 105 73 - 393 U/L  
CARDIAC PANEL,(CK, CKMB & TROPONIN) Collection Time: 07/30/18  9:50 PM  
Result Value Ref Range  26 - 192 U/L  
 CK - MB 1.3 <3.6 ng/ml CK-MB Index 1.2 0.0 - 4.0 % Troponin-I, Qt. <0.02 0.00 - 0.06 NG/ML  
EKG, 12 LEAD, INITIAL Collection Time: 07/30/18 10:22 PM  
Result Value Ref Range Ventricular Rate 70 BPM  
 Atrial Rate 70 BPM  
 P-R Interval 152 ms QRS Duration 82 ms Q-T Interval 422 ms QTC Calculation (Bezet) 455 ms Calculated P Axis 36 degrees Calculated R Axis 44 degrees Calculated T Axis 45 degrees Diagnosis Normal sinus rhythm Normal ECG When compared with ECG of 26-JUN-2017 10:53, No significant change was found Radiologic Studies -   
XR CHEST PORT    (Results Pending) CT ABD PELV W CONT    (Results Pending) 11:46 PM 
RADIOLOGY FINDINGS Chest X-ray shows no acute process Pending review by Radiologist 
Recorded by Edy Ying ED Scribe, as dictated by Lucio Moreno MD 
 
CT Results  (Last 48 hours) None CXR Results  (Last 48 hours) None Medical Decision Making I am the first provider for this patient. I reviewed the vital signs, available nursing notes, past medical history, past surgical history, family history and social history. Vital Signs-Reviewed the patient's vital signs. Pulse Oximetry Analysis - 100% on RA  
 
EKG interpretation: (Preliminary) 10:22 PM  
NSR at 70 bpm. No STEMI. EKG read by Lucio Moreno MD at 10:22 PM 
 
Records Reviewed: Nursing Notes Provider Notes (Medical Decision Making):  
Ddx: Gastritis, PUD, UTI, pancreatitis, obstruction, mass, ulcer, hernia, ACS Procedures: 
Procedures MEDICATIONS GIVEN: 
Medications  
iopamidol (ISOVUE 300) 61 % contrast injection 100 mL (not administered)  
ondansetron (ZOFRAN) injection 4 mg (4 mg IntraVENous Given 7/30/18 2206) famotidine (PF) (PEPCID) injection 20 mg (20 mg IntraVENous Given 7/30/18 2208) acetaminophen (OFIRMEV) infusion 1,000 mg (0 mg IntraVENous IV Completed 7/30/18 2317) cefTRIAXone (ROCEPHIN) 1 g in sterile water (preservative free) 10 mL IV syringe (1 g IntraVENous Given 7/30/18 2211) ED Course:  
9:07 PM  
Initial assessment performed. The patients presenting problems have been discussed, and they are in agreement with the care plan formulated and outlined with them. I have encouraged them to ask questions as they arise throughout their visit. 11:58 PM 
Pt want to go home. She does not want to proceed with CT scan. She was only concerned due to earlier pain in area of upper abd h/o gastric bypass. Understands risk of not doing ct scan at this time, though RTED instructions given. On re-exam, abd is soft, non-tender, non distended at this time. Pt is thankful for the care. I advised no alcohol, repeat liver tests in 2 days, and to minimize the use of Tylenol. Diagnosis and Disposition DISCHARGE NOTE: 
11:58 PM 
Mark Davison's  results have been reviewed with her. She has been counseled regarding her diagnosis, treatment, and plan. She verbally conveys understanding and agreement of the signs, symptoms, diagnosis, treatment and prognosis and additionally agrees to follow up as discussed. She also agrees with the care-plan and conveys that all of her questions have been answered. I have also provided discharge instructions for her that include: educational information regarding their diagnosis and treatment, and list of reasons why they would want to return to the ED prior to their follow-up appointment, should her condition change. She has been provided with education for proper emergency department utilization. CLINICAL IMPRESSION: 
 
1. Abdominal pain, LUQ (left upper quadrant) 2. Abdominal pain, epigastric 3. Urinary tract infection without hematuria, site unspecified 4. Abnormal LFTs PLAN: 
1. D/C Home 2. Current Discharge Medication List  
  
START taking these medications Details  
cephALEXin (KEFLEX) 500 mg capsule Take 1 Cap by mouth three (3) times daily for 7 days. Qty: 21 Cap, Refills: 0  
  
  
 
3. Follow-up Information Follow up With Details Comments Contact Info Ayan Venegas MD Call in 1 day For follow up with bariatric sugvivian [id number] [facility] [address] [facility] [address] 
[phone] Lucero Alston MD Call in 1 day For primary care follow up and repeat liver function tests in 2 days [address] [facility] [address] [place] [facility] 
[phone] [facility] EMER[facility] DE[facility]  As needed, If symptoms worsen [address] [place] [id number] [phone]  
  
 
_______________________________ Attestations: This note is prepared by Jonny Montes, acting as Scribe for Bryanna Coker MD. 
 
Bryanna Coker MD:  The scribe's documentation has been prepared under my direction and personally reviewed by me in its entirety. I confirm that the note above accurately reflects all work, treatment, procedures, and medical decision making performed by me. 
_______________________________

## 2018-08-01 LAB
BACTERIA SPEC CULT: NORMAL
SERVICE CMNT-IMP: NORMAL

## 2018-08-02 LAB
ATRIAL RATE: 70 BPM
CALCULATED P AXIS, ECG09: 36 DEGREES
CALCULATED R AXIS, ECG10: 44 DEGREES
CALCULATED T AXIS, ECG11: 45 DEGREES
DIAGNOSIS, 93000: NORMAL
P-R INTERVAL, ECG05: 152 MS
Q-T INTERVAL, ECG07: 422 MS
QRS DURATION, ECG06: 82 MS
QTC CALCULATION (BEZET), ECG08: 455 MS
VENTRICULAR RATE, ECG03: 70 BPM

## 2018-09-04 ENCOUNTER — OFFICE VISIT (OUTPATIENT)
Dept: SURGERY | Age: 43
End: 2018-09-04

## 2018-11-29 NOTE — PROGRESS NOTES
A user error has taken place: encounter opened in error, closed for administrative reasons. patient did not show for appt. There was a glitch in the system which is requiring me to add this and close the note with code [id number].

## 2019-07-24 ENCOUNTER — DOCUMENTATION ONLY (OUTPATIENT)
Dept: SURGERY | Age: 44
End: 2019-07-24

## 2019-07-24 NOTE — LETTER
[employer] [facility] 
 
 
Dear Patient, Your health is our main concern. It is important for your health to have follow-up lab work and to see you surgeon at 2 months, 4 months, 6 months, 9 months and annually after your weight loss surgery. Additionally, the Department of Bariatric Surgery at our hospital is a member of the [employer] [address] Surgical Quality Improvement Program ([facility] NSQIP). As a participant in this program, we gather information on the outcomes of our patients after surgery. Please call the office for a follow up appointment at [phone]. If you have moved out of the area or have changed surgeons please call us and let us know the name of your doctor. Your health and feedback are important to us. We greatly appreciate your response. Thank you, [facility]

## 2019-07-24 NOTE — PROGRESS NOTES
Per [facility] requirements;  E-mail and letter sent for follow up appointment. [employer] [facility] [place]  [employer] [facility]  [facility]      Dear Patient,    Your health is our main concern. It is important for your health to have follow-up lab work and to see you surgeon at 2 months, 4 months, 6 months, 9 months and annually after your weight loss surgery. Additionally, the Department of Bariatric Surgery at our hospital is a member of the [employer] [address] Surgical Quality Improvement Program ([facility] NSQIP). As a participant in this program, we gather information on the outcomes of our patients after surgery. Please call the office for a follow up appointment at [phone]. If you have moved out of the area or have changed surgeons please call us and let us know the name of your doctor. Your health and feedback are important to us. We greatly appreciate your response.        Thank you,  [facility] [address] [facility]

## (undated) DEVICE — SYR IRR CATH TIP LR ADPT 70ML -- CONVERT TO ITEM 363120

## (undated) DEVICE — MAJ-1414 SINGLE USE ADPATER BIOPSY VALV: Brand: SINGLE USE ADAPTOR BIOPSY VALVE

## (undated) DEVICE — STAPLER INT L37CM STPL 21MM CIR ENDOSCP CRV INTLUMN B FRM

## (undated) DEVICE — APPLIER CLP L SHFT DIA12MM 20 ROT MULT LIGACLP

## (undated) DEVICE — SYR 3ML LL TIP 1/10ML GRAD --

## (undated) DEVICE — SEALANT HEMSTAT 5ML HUM FIBRIN THROM 2 VI APPL DEV EVICEL

## (undated) DEVICE — RELOAD STPL H1.8-3.8MM REG THCK TISS G 6 ROW GRIPPING SURF

## (undated) DEVICE — SOL IRRIGATION INJ NACL 0.9% 500ML BTL

## (undated) DEVICE — SUTURE PDS II SZ 0 L27IN ABSRB VLT L26MM CT-2 1/2 CIR Z334H

## (undated) DEVICE — NDL PRT INJ NSAF BLNT 18GX1.5 --

## (undated) DEVICE — ENDOCUT SCISSOR TIP, DISPOSABLE: Brand: RENEW

## (undated) DEVICE — SUTURE ETHIB EXCL BR GRN TAPR PT 2-0 30 X563H X563H

## (undated) DEVICE — RELOAD STPL L60MM H1-2.6MM MESENTERY THN TISS WHT 6 ROW

## (undated) DEVICE — CATHETER JEJUSTMY AD 16FR 15CC L108IN THMB VLV FOR DCOMPR

## (undated) DEVICE — KENDALL SCD EXPRESS SLEEVES, KNEE LENGTH, MEDIUM: Brand: KENDALL SCD

## (undated) DEVICE — CLIP SUT ENDOSCP F/2-0/3-0/4-0 -- LAPRA-TY

## (undated) DEVICE — CUTTER ENDOSCP L340MM LIN ARTC SGL STROKE FIRING ENDOPATH

## (undated) DEVICE — TROCAR ENDOSCP L100MM DIA15MM BLDELSS STBL SL ENDOPATH XCEL

## (undated) DEVICE — STERILE POLYISOPRENE POWDER-FREE SURGICAL GLOVES: Brand: PROTEXIS

## (undated) DEVICE — SHEAR HARMONIC 5MMX45CM -- ACE 7+

## (undated) DEVICE — PREP SKN PREVAIL 40ML APPL --

## (undated) DEVICE — TISSUE RETRIEVAL SYSTEM: Brand: INZII RETRIEVAL SYSTEM

## (undated) DEVICE — (D)GLOVE SURG TRIFLX 7.5 PWD -- DISC BY MFR USE ITEM 102005

## (undated) DEVICE — ENDO CARRY-ON PROCEDURE KIT INCLUDES ENZYMATIC SPONGE, GAUZE, BIOHAZARD LABEL, TRAY, LUBRICANT, DIRTY SCOPE LABEL, WATER LABEL, TRAY, DRAWSTRING PAD, AND DEFENDO 4-PIECE KIT.: Brand: ENDO CARRY-ON PROCEDURE KIT

## (undated) DEVICE — MEDI-VAC NON-CONDUCTIVE SUCTION TUBING: Brand: CARDINAL HEALTH

## (undated) DEVICE — GOWN ISOLATN REG BLU POLY UNISX W/ THMB LOOP

## (undated) DEVICE — TROCAR ENDOSCP BLDELSS 12X100 MM W/ HNDL STBL SL OPT TIP

## (undated) DEVICE — AMD ANTIMICROBIAL DRAIN SPONGES, 6 PLY, 0.2% POLYHEXAMETHYLENE BIGUANIDE HCI (PHMB): Brand: EXCILON

## (undated) DEVICE — TROCAR LAP L100MM DIA5MM BLDELSS W/ STBL SL ENDOPATH XCEL

## (undated) DEVICE — STAPLER SKIN L440MM 32MM LNG 12 FIRING B FRM PWR + GRIPPING

## (undated) DEVICE — TROCAR ENDOSCP L100MM DIA12MM STBL SL BLDELSS ENDOPATH XCEL

## (undated) DEVICE — 3L THIN WALL CAN: Brand: CRD

## (undated) DEVICE — BARIATRIC: Brand: MEDLINE INDUSTRIES, INC.

## (undated) DEVICE — SUT MONOCRYL PLUS UD 4-0 --

## (undated) DEVICE — APPLICATOR FBR TIP L6IN COT TIP WOOD SHFT SWAB 2000 PER CA

## (undated) DEVICE — TRAY CATH OD16FR SIL URIN M STATLOK STBL DEV SURSTP

## (undated) DEVICE — NEEDLE INSUF L150MM DIA2MM DISP FOR PNEUMOPERI ENDOPATH

## (undated) DEVICE — Z DISCONTINUED USE 2660780 STAPLER INT L28CM DIA33MM CLS STPL H10-2.5MM OPN LEG L5.5MM

## (undated) DEVICE — FORCEPS BX OVL CUP SERR DISP CAP L 240CM RAD JAW 4

## (undated) DEVICE — DRAIN SURG 15FR L3/16IN SIL RND 3/4 FLUT 3/16IN TRCR

## (undated) DEVICE — Device

## (undated) DEVICE — SUT PROL 2-0 30IN CT2 BLU --

## (undated) DEVICE — 3M™ STERI-STRIP™ REINFORCED ADHESIVE SKIN CLOSURES, R1548, 1 IN X 5 IN (25 MM X 125 MM), 4 STRIPS/ENVELOPE: Brand: 3M™ STERI-STRIP™

## (undated) DEVICE — SUTURE ETHLN SZ 3-0 L30IN NONABSORBABLE BLK FSL L30MM 3/8 1671H

## (undated) DEVICE — RELOAD STPL H1-2.5X45MM VASC THN TISS WHT 6 ROW B FRM SGL

## (undated) DEVICE — SOLUTION LACTATED RINGERS INJECTION USP

## (undated) DEVICE — TIP APPL L35CM RIG FOR SEAL EVICEL

## (undated) DEVICE — DEVON™ KNEE AND BODY STRAP 60" X 3" (1.5 M X 7.6 CM): Brand: DEVON